# Patient Record
Sex: FEMALE | Race: WHITE | NOT HISPANIC OR LATINO | Employment: FULL TIME | URBAN - METROPOLITAN AREA
[De-identification: names, ages, dates, MRNs, and addresses within clinical notes are randomized per-mention and may not be internally consistent; named-entity substitution may affect disease eponyms.]

---

## 2017-07-18 ENCOUNTER — TRANSFERRED RECORDS (OUTPATIENT)
Dept: HEALTH INFORMATION MANAGEMENT | Facility: CLINIC | Age: 47
End: 2017-07-18

## 2017-12-19 ENCOUNTER — TRANSFERRED RECORDS (OUTPATIENT)
Dept: HEALTH INFORMATION MANAGEMENT | Facility: CLINIC | Age: 47
End: 2017-12-19

## 2018-09-05 ENCOUNTER — TRANSFERRED RECORDS (OUTPATIENT)
Dept: HEALTH INFORMATION MANAGEMENT | Facility: CLINIC | Age: 48
End: 2018-09-05

## 2019-01-15 ENCOUNTER — TRANSFERRED RECORDS (OUTPATIENT)
Dept: HEALTH INFORMATION MANAGEMENT | Facility: CLINIC | Age: 49
End: 2019-01-15

## 2019-03-12 ENCOUNTER — TRANSFERRED RECORDS (OUTPATIENT)
Dept: HEALTH INFORMATION MANAGEMENT | Facility: CLINIC | Age: 49
End: 2019-03-12

## 2020-10-03 ENCOUNTER — TRANSFERRED RECORDS (OUTPATIENT)
Dept: HEALTH INFORMATION MANAGEMENT | Facility: CLINIC | Age: 50
End: 2020-10-03

## 2020-10-11 ENCOUNTER — TRANSFERRED RECORDS (OUTPATIENT)
Dept: HEALTH INFORMATION MANAGEMENT | Facility: CLINIC | Age: 50
End: 2020-10-11

## 2020-10-22 ENCOUNTER — TRANSFERRED RECORDS (OUTPATIENT)
Dept: HEALTH INFORMATION MANAGEMENT | Facility: CLINIC | Age: 50
End: 2020-10-22

## 2020-10-27 ENCOUNTER — TRANSCRIBE ORDERS (OUTPATIENT)
Dept: OTHER | Age: 50
End: 2020-10-27

## 2020-10-27 DIAGNOSIS — C50.919 INVASIVE DUCTAL CARCINOMA OF BREAST (H): ICD-10-CM

## 2020-10-27 DIAGNOSIS — C50.919 INFILTRATING DUCTAL CARCINOMA OF BREAST (H): Primary | ICD-10-CM

## 2020-10-28 ENCOUNTER — PATIENT OUTREACH (OUTPATIENT)
Dept: SURGERY | Facility: CLINIC | Age: 50
End: 2020-10-28

## 2020-10-28 NOTE — TELEPHONE ENCOUNTER
New Patient Oncology Nurse Navigator Note     Referring provider: Dr. Chen Ma     Referring Clinic/Organization: [facility]      Referred to: Surgical Oncology - Breast Surgery    Requested provider (if applicable): First available provider    Referral Received: 10/28/20       Evaluation for : Invasive ductal Carcinoma and DCIS of Right breast      Clinical History (per Nurse review of records provided):      - Right breast mass seen on mammogram, US guided biopsy completed.     US 10/20:   ULTRASOUND: Ultrasound targeted to the mammographic mass reveals a hypoechoic, solid irregular mass at the 11:00 position 6 cm from the nipple measuring 5 x 5 x 5 mm, a suspicious finding for which ultrasound-guided biopsy is recommended. Sonographic survey of the axilla is unremarkable.    Clinical Assessment / Barriers to Care (Per Nurse):    Unable to assess at this time.       Records Location:     [facility] Everywhere     Records Needed:     PATHOLOGY REPORT  [facility]     Breast MRI - per notes was done on 10/28.       Additional testing needed prior to consult:     NONE AT THIS TIME    Referral updates and Plan:       Consult with Surgical Oncology       10/28/2020 4:11 PM - Called and left a message for patient to call back and discuss referral and previous work up and location, informed her in the message that we are working on getting outside records and that we are hoping to get process moving for her.     10/29/2020 12:40 PM - Patient returned call regarding scheduling consult with surgery. Informed her that we are also waiting on the finalized pathology that indicates her hormone status. Informed her that pending those results it maybe recommended she meet with medical oncology first. She verbalized understanding and confirmed plan for appointment with surgery for now. Patient was transferred to scheduling to finalize appointment details  with Dr. Alexander.       Sasha Bowie, RN, BSN   Surgical Oncology New Patient Nurse Navigator  [facility] [place] [facility]  [phone]

## 2020-10-29 NOTE — TELEPHONE ENCOUNTER
ONCOLOGY INTAKE: Records Information      APPT INFORMATION:  Referring provider:  Dr. Chen Colon MD  Referring provider s clinic:  [facility]  Reason for visit/diagnosis:  Infiltrating ductal carcinoma of breast   Has patient been notified of appointment date and time?: Yes    RECORDS INFORMATION:  Were the records received with the referral (via [employer])? Yes    Has patient been seen for any external appt for this diagnosis? Yes    If yes, where? [facility]    Has patient had any imaging or procedures outside of [facility] for this condition? Yes      If Yes, where? [facility]    ADDITIONAL INFORMATION:  None

## 2020-10-30 NOTE — TELEPHONE ENCOUNTER
Action    Action Taken 10/30/20:    -Imaging from [facility] (10/2020, Reports in [facility]) previously resolved. Additional imaging requested, detailed below.    -[employer] Tracking/[facility] - Path: [id number]    -Imaging from [facility] resolved, and now viewable to PACS  12:14 PM     RECORDS STATUS - BREAST    RECORDS REQUESTED FROM:    DATE REQUESTED:    NOTES DETAILS STATUS   OFFICE NOTE from referring provider [facility] -  Dr. Chen Colon MD   OFFICE NOTE from medical oncologist     OFFICE NOTE from surgeon     OFFICE NOTE from radiation oncologist     DISCHARGE SUMMARY from hospital     DISCHARGE REPORT from the ER     OPERATIVE REPORT     MEDICATION LIST     CLINICAL TRIAL TREATMENTS TO DATE     LABS     PATHOLOGY REPORTS  (Tissue diagnosis, Stage, ER/FL percentage positive and intensity of staining, HER2 IHC, FISH, and all biopsies from breast and any distant metastasis)                 [facility], Report & Slides requested 10/30 10/23/20   GENONOMIC TESTING     TYPE:   (Next Generation Sequencing, including Foundation One testing, and Oncotype score)     IMAGING (NEED IMAGES & REPORT)     CT SCANS     MRI     MAMMO PACS 3/13/19, 12/20/17, 4/2/15: , Report in [facility]   ULTRASOUND     PET     BONE SCAN     BRAIN MRI PACS 7/18/17: , Report in [facility]

## 2020-10-30 NOTE — TELEPHONE ENCOUNTER
Action 10.30.20 MJ   Action Taken Received med recs from . Sent to scanning. Placed in Felicitas's folder as well.

## 2020-11-02 ENCOUNTER — PATIENT OUTREACH (OUTPATIENT)
Dept: SURGERY | Facility: CLINIC | Age: 50
End: 2020-11-02

## 2020-11-02 NOTE — TELEPHONE ENCOUNTER
Surgical Oncology RN Care Coordination Note:     Returned call to patient and left her a message letting her know that we have access to the pathology report and that she will have a lab appointment tentatively scheduled after her visit with Dr. Alexander so that if he decides he wants labs done post visit she can just have them drawn that day. Informed her if she has more questions she can contact me back directly to discuss.     Sasha Bowie, RN, BSN  Care Coordinator

## 2020-11-08 ENCOUNTER — HEALTH MAINTENANCE LETTER (OUTPATIENT)
Age: 50
End: 2020-11-08

## 2020-11-09 ENCOUNTER — ONCOLOGY VISIT (OUTPATIENT)
Dept: ONCOLOGY | Facility: CLINIC | Age: 50
End: 2020-11-09
Attending: RADIOLOGY
Payer: COMMERCIAL

## 2020-11-09 ENCOUNTER — PRE VISIT (OUTPATIENT)
Dept: ONCOLOGY | Facility: CLINIC | Age: 50
End: 2020-11-09

## 2020-11-09 VITALS
HEART RATE: 77 BPM | RESPIRATION RATE: 18 BRPM | HEIGHT: 68 IN | DIASTOLIC BLOOD PRESSURE: 76 MMHG | SYSTOLIC BLOOD PRESSURE: 119 MMHG | OXYGEN SATURATION: 98 % | TEMPERATURE: 98.5 F | WEIGHT: 136 LBS | BODY MASS INDEX: 20.61 KG/M2

## 2020-11-09 DIAGNOSIS — C50.919 INFILTRATING DUCTAL CARCINOMA OF BREAST (H): ICD-10-CM

## 2020-11-09 PROCEDURE — G0463 HOSPITAL OUTPT CLINIC VISIT: HCPCS

## 2020-11-09 PROCEDURE — 99203 OFFICE O/P NEW LOW 30 MIN: CPT | Performed by: SURGERY

## 2020-11-09 RX ORDER — MULTIPLE VITAMINS W/ MINERALS TAB 9MG-400MCG
1 TAB ORAL DAILY
COMMUNITY
End: 2024-06-28

## 2020-11-09 ASSESSMENT — MIFFLIN-ST. JEOR: SCORE: 1275.39

## 2020-11-09 ASSESSMENT — PAIN SCALES - GENERAL: PAINLEVEL: NO PAIN (0)

## 2020-11-09 NOTE — LETTER
Date:November 17, 2020      Patient was self referred, no letter generated. Do not send.        [facility] Health Information

## 2020-11-09 NOTE — NURSING NOTE
"Oncology Rooming Note    November 9, 2020 4:50 PM   Radha Alcala is a 50 year old female who presents for:    Chief Complaint   Patient presents with     New Patient     Infiltrating ductal carcinoma of breast (H)     Initial Vitals: /76   Pulse 77   Temp 98.5  F (36.9  C) (Oral)   Resp 18   Ht 1.727 m (5' 8\")   Wt 61.7 kg (136 lb)   SpO2 98%   BMI 20.68 kg/m   Estimated body mass index is 20.68 kg/m  as calculated from the following:    Height as of this encounter: 1.727 m (5' 8\").    Weight as of this encounter: 61.7 kg (136 lb). Body surface area is 1.72 meters squared.  No Pain (0) Comment: Data Unavailable   No LMP recorded.  Allergies reviewed: Yes  Medications reviewed: Yes    Medications: Medication refills not needed today.  Pharmacy name entered into [facility]: [id number] - [place] [id number] AT [facility] [address]    Clinical concerns: New patient.       Melissa Cunningham MA            "

## 2020-11-09 NOTE — LETTER
11/9/2020         RE: Radha Alcala  [address] [facility] [address]        Dear Colleague,    Thank you for referring your patient, Radha Alcala, to the [facility]. Please see a copy of my visit note below.    HISTORY OF PRESENT ILLNESS:  Radha Alcala is a 50-year-old woman who presents for a second opinion regarding treatment of her right breast cancer.  On 10/11 she underwent a screening mammogram which revealed a spiculated mass at the 11 o'clock position.  She had an ultrasound which confirmed a 5 mm, suspicious mass.  She underwent a core needle biopsy which demonstrated a grade 2 invasive ductal cancer.  The tumor was estrogen receptor positive, progesterone receptor positive, HER-2 positive.  There was also DCIS present.  There was suspicion for angiolymphatic invasion.  She had a breast MRI which demonstrated a 5 mm mass.  There were no other suspicious breast findings and her lymph nodes were negative.  She is now here to talk about treatment options.  She has not been able to palpate a mass in her breast.  She denies any symptoms of metastatic disease.  She has had no prior history of any breast problems.      PAST MEDICAL HISTORY:  No major medical problems.      FAMILY HISTORY:  Negative for breast or ovarian cancer.      PHYSICAL EXAMINATION:   GENERAL:  She is a well-appearing woman in no apparent distress.   HEENT:  Head and neck examination reveals no cervical, supraclavicular or infraclavicular lymphadenopathy.   BREASTS:  Bilateral breast examination reveals no dominant masses, skin changes, nipple changes or axillary lymphadenopathy.      IMPRESSION:  5 mm HER-2 positive, ER positive breast cancer.      PLAN:  I talked to her about the various treatment options, including a mastectomy with or without reconstruction versus lumpectomy plus radiation therapy.  She is interested in breast-conserving surgery.  I did recommend a sentinel lymph  node biopsy.  I told her that endocrine therapy would be recommended.  I told her that usually for HER-2 positive breast cancers, we do recommend chemotherapy and Herceptin.  This is often given in a neoadjuvant setting.  However, I told her there is no survival benefit of having one approach over the other and it would not alter her surgical treatment.  I did tell her that by having preoperative chemotherapy there is an advantage of having the prognostic information of a complete pathologic response.  She and her  want to proceed with a surgery-first approach.  We will obtain genetic testing on her and we will plan on doing a lumpectomy, sentinel lymph node biopsy and refer her to Oncology.      TT:  40 minutes.  CT:  30 minutes.           Again, thank you for allowing me to participate in the care of your patient.        Sincerely,        Kirby Alexander MD

## 2020-11-09 NOTE — PROGRESS NOTES
HISTORY OF PRESENT ILLNESS:  Radha Alcala is a 50-year-old woman who presents for a second opinion regarding treatment of her right breast cancer.  On 10/11 she underwent a screening mammogram which revealed a spiculated mass at the 11 o'clock position.  She had an ultrasound which confirmed a 5 mm, suspicious mass.  She underwent a core needle biopsy which demonstrated a grade 2 invasive ductal cancer.  The tumor was estrogen receptor positive, progesterone receptor positive, HER-2 positive.  There was also DCIS present.  There was suspicion for angiolymphatic invasion.  She had a breast MRI which demonstrated a 5 mm mass.  There were no other suspicious breast findings and her lymph nodes were negative.  She is now here to talk about treatment options.  She has not been able to palpate a mass in her breast.  She denies any symptoms of metastatic disease.  She has had no prior history of any breast problems.      PAST MEDICAL HISTORY:  No major medical problems.      FAMILY HISTORY:  Negative for breast or ovarian cancer.      PHYSICAL EXAMINATION:   GENERAL:  She is a well-appearing woman in no apparent distress.   HEENT:  Head and neck examination reveals no cervical, supraclavicular or infraclavicular lymphadenopathy.   BREASTS:  Bilateral breast examination reveals no dominant masses, skin changes, nipple changes or axillary lymphadenopathy.      IMPRESSION:  5 mm HER-2 positive, ER positive breast cancer.      PLAN:  I talked to her about the various treatment options, including a mastectomy with or without reconstruction versus lumpectomy plus radiation therapy.  She is interested in breast-conserving surgery.  I did recommend a sentinel lymph node biopsy.  I told her that endocrine therapy would be recommended.  I told her that usually for HER-2 positive breast cancers, we do recommend chemotherapy and Herceptin.  This is often given in a neoadjuvant setting.  However, I told her there is no survival  benefit of having one approach over the other and it would not alter her surgical treatment.  I did tell her that by having preoperative chemotherapy there is an advantage of having the prognostic information of a complete pathologic response.  She and her  want to proceed with a surgery-first approach.  We will obtain genetic testing on her and we will plan on doing a lumpectomy, sentinel lymph node biopsy and refer her to Oncology.      TT:  40 minutes.  CT:  30 minutes.

## 2020-11-10 ENCOUNTER — TELEPHONE (OUTPATIENT)
Dept: ONCOLOGY | Facility: CLINIC | Age: 50
End: 2020-11-10

## 2020-11-10 NOTE — TELEPHONE ENCOUNTER
Spoke with the patient and she asked to have her lab draw tomorrow 11/11 at 2:45 PM instead of 11/12. Moved appointment and updated nurse.

## 2020-11-11 ENCOUNTER — PRE VISIT (OUTPATIENT)
Dept: ONCOLOGY | Facility: CLINIC | Age: 50
End: 2020-11-11

## 2020-11-11 DIAGNOSIS — C50.919 INFILTRATING DUCTAL CARCINOMA OF BREAST (H): ICD-10-CM

## 2020-11-11 NOTE — TELEPHONE ENCOUNTER
ONCOLOGY INTAKE: Records Information      APPT INFORMATION:  Referring provider:  Dr. Alexander  Referring provider s clinic:  [facility]  Reason for visit/diagnosis:  breast cancer  Has patient been notified of appointment date and time?: yes    RECORDS INFORMATION:  Were the records received with the referral (via [employer])? no    Has patient been seen for any external appt for this diagnosis? no    If yes, where? n/a    Has patient had any imaging or procedures outside of [facility] for this condition? no      If Yes, where? n/a    ADDITIONAL INFORMATION:  none

## 2020-11-12 ENCOUNTER — DOCUMENTATION ONLY (OUTPATIENT)
Dept: ONCOLOGY | Facility: CLINIC | Age: 50
End: 2020-11-12

## 2020-11-12 ENCOUNTER — HOSPITAL ENCOUNTER (OUTPATIENT)
Facility: AMBULATORY SURGERY CENTER | Age: 50
End: 2020-11-12
Attending: SURGERY
Payer: COMMERCIAL

## 2020-11-12 DIAGNOSIS — Z11.59 ENCOUNTER FOR SCREENING FOR OTHER VIRAL DISEASES: Primary | ICD-10-CM

## 2020-11-12 DIAGNOSIS — C50.919 INFILTRATING DUCTAL CARCINOMA OF BREAST (H): ICD-10-CM

## 2020-11-12 LAB — COPATH REPORT: NORMAL

## 2020-11-12 NOTE — TELEPHONE ENCOUNTER
RECORDS STATUS - BREAST    RECORDS REQUESTED FROM: PLEASE SEE PRE VISIT DR. SARAVIA 11/9/20   DATE REQUESTED:    NOTES DETAILS STATUS   OFFICE NOTE from referring provider Kirby Soler MD in  [facility]   OFFICE NOTE from medical oncologist     OFFICE NOTE from surgeon     OFFICE NOTE from radiation oncologist     DISCHARGE SUMMARY from hospital     DISCHARGE REPORT from the ER     OPERATIVE REPORT     MEDICATION LIST     CLINICAL TRIAL TREATMENTS TO DATE     LABS     PATHOLOGY REPORTS  (Tissue diagnosis, Stage, ER/TX percentage positive and intensity of staining, HER2 IHC, FISH, and all biopsies from breast and any distant metastasis)                     GENONOMIC TESTING     TYPE:   (Next Generation Sequencing, including Foundation One testing, and Oncotype score)     IMAGING (NEED IMAGES & REPORT)     CT SCANS     MRI     MAMMO     ULTRASOUND     PET     BONE SCAN     BRAIN MRI

## 2020-11-12 NOTE — PROGRESS NOTES
I contacted the patient via [employer] and spoke with her to confirm the scheduled dates and provide the following information:     Surgery is scheduled with Dr. Alexander on 12/23 at [facility].  Scheduled per surgeon.      COVID-19 test scheduled for 12/21    H&P: to be completed by PCP.  POST-OP: 1/6, 1/22.    They are aware that they will receive a call  ~2 days prior to the scheduled procedure and will be given an exact arrival/start time.    The surgery packet was provided via [employer].

## 2020-11-13 ENCOUNTER — TRANSCRIBE ORDERS (OUTPATIENT)
Dept: OTHER | Age: 50
End: 2020-11-13

## 2020-11-13 DIAGNOSIS — C50.919 BREAST CANCER (H): Primary | ICD-10-CM

## 2020-11-13 NOTE — TELEPHONE ENCOUNTER
RECORDS STATUS - BREAST    RECORDS REQUESTED FROM: EPIC   DATE REQUESTED: 11/18/2020    NOTES DETAILS STATUS   OFFICE NOTE from referring provider     OFFICE NOTE from medical oncologist Complete Epic    Oncology Visit with Dr. Alexander 11/9/2020 11/2/2002 [facility] Infiltrating ductal carcinoma of upper-outer quadrant of right breast      OFFICE NOTE from surgeon Complete See Biopsy Report Below    OFFICE NOTE from radiation oncologist     DISCHARGE SUMMARY from hospital N/A    DISCHARGE REPORT from the ER     OPERATIVE REPORT Complete  See Biopsy Report Below    MEDICATION LIST Complete Epic/   CLINICAL TRIAL TREATMENTS TO DATE     LABS     PATHOLOGY REPORTS  (Tissue diagnosis, Stage, ER/MI percentage positive and intensity of staining, HER2 IHC, FISH, and all biopsies from breast and any distant metastasis)                 Biopsy was done at  [facility]     10/22/2020   Surgical Pathology                                Case: [id number]   Breast, right, 11:00 6 cm fn , needle core biopsy:  Infiltrating ductal carcinoma, Omnica grade 2  Associated ductal carcinoma in-situ, nuclear grade 2  Foci suspicious for angiolymphatic invasion   GENONOMIC TESTING     TYPE:   (Next Generation Sequencing, including Foundation One testing, and Oncotype score) Complete [facility]- Her 2 Fish 10/22/2020    IMAGING (NEED IMAGES & REPORT)     CT SCANS     MRI Complete - [facility] MR Breast 10/28/2020    NM Lymph Upcoming internal IMG 12/23/2020   MAMMO Upcoming internal IMG    Request- [facility]  MA Breast Specimen Right 12/23/2020     Request- [facility]  10/22/2020    ULTRASOUND Upcoming internal IMG US Breast Wire Placement 12/23/2020    PET     BONE SCAN     BRAIN MRI

## 2020-11-16 NOTE — PROGRESS NOTES
Oncology Consultation:  Date on this visit: 11/17/2020    Radha Alcala is referred by Dr.Todd MELISSA Alexander for an oncology consultation. She requires evaluation for new diagnosis of right breast cancer.    Primary Physician: No Ref-Primary, Physician     History Of Present Illness:  Ms. Alcala is a 50 year old female with ER positive, HER2 positive right breast cancer.  Routine screening mammogram on 10/12/2020 showed a spiculated mass at 11:00, posterior depth of the right breast.  Ultrasound confirmed a mass measuring 5 mm at 11:00, 6 cm from the nipple and normal appearing lymph nodes in the axilla.  Right breast biopsy on 10/22/2020 was consistent with a grade 2 invasive ductal carcinoma with associated DCIS.  There was a focus suspicious for angiolymphatic invasion.  ER was moderate in 70% and [place] weak in 5%,  HER-2 was equivocal by IHC and amplified by FISH with approximately 16.4 HER2 signals per cell and a HER2/CEP17 ratio of 6.8.  Right breast lumpectomy with Dr. Alexander is tentatively scheduled for 12/23/2020.      Ms. Alcala denies prior h/o breast issues or biopsies  She is generally in good health.  She has some anxiety/depression, but has never required treatment.  She exercises regularly, walking, biking, and doing yoga.  She has right knee pain due to a sprained AC ligament and also has a pulled right hamstring.  She denies bone or joint aches or pains.  She has had no cough or shortness of breath.  She has no abdominal complaints.  She has an occasional headache due to dehydration.  She reports increase in headaches the last two weeks due to stress.  She denies focal neurologic complaints.  The remainder of a complete 12 point review of systems was reviewed with the patient and was negative with the exception of that mentioned above.    Patient underwent menarche at 13-13 yo.  She has two children.  Her pregnancies were at ages 30 and 33 years old.  She  each of her children for  approximately 1 year.  She had an IUD removed recently and has not had a menstrual bleed since.  She denies menopause symptoms with the exception of perhaps an occasional hot flash at night.    Past Medical/Surgical History:  1.  Breast cancer as per HPI  2.  Rosacea  3.  Recurrent sinusitis    Allergies:  Allergies as of 11/17/2020     (No Known Allergies)     Current Medications:  Current Outpatient Medications   Medication Sig Dispense Refill     levonorgestrel (MIRENA) 20 MCG/24HR IUD 1 each by Intrauterine route once       multivitamin w/minerals (MULTI-VITAMIN) tablet Take 1 tablet by mouth daily        Family History:  Mother with a h/o colorectal cancer at 80 yo.  Father with a h/o prostate cancer.  Paternal grandfather with a h/o brain cancer.  Patient has 3 brothers and a sister without h/o cancer.      Social History:  .  Has two children, both out of the house.  Patient works as a  at the [employer].  She is a lifetime nonsmoker.  She has approximately 1-2 drinks/week.  She denies illicit drug use.      Physical Exam:  General:  Well appearing adult female in NAD.  Eyes:  No erythema or discharge.  Respiratory:  Breathing comfortably on room air.  No wheezing or distress.  Musculoskeletal:  Full ROM of the bilateral upper extremities.  Skin:  No visible concerning skin rashes or lesions  Neuro:  No notable tremor and dyskinetic movements.  Psych:  Mood and affect appear normal.    The rest of a comprehensive physical examination is deferred due to PHE (public health emergency) video visit restrictions.    Laboratory/Imaging Studies  10/12/2020 Bilateral screening mammograms ([facility]):  Spiculated mass at 11:00, posterior depth.    10/22/2020 Diagnostic mammogram and right breast ultrasound:  - The right breast is heterogeneously dense.  Persistent mass 11:00, middle depth.  - Ultrasound shows mass at 11:00, 6 cm from the nipple to measure 5 mm.  Axilla is  unremarkable.    10/22/2020 Right breast biopsy:  - Grade 2 infiltrating ductal carcinoma  - Intermediate grade DCIS  - Foci suspicious for angiolymphatic invasion  - ER moderate in 70%  - PA weak in 5%  - HER2 equivocal by IHC  - HER2 positive by FISH with approximately 16.4 HER2 signals per cell and a HER2/CEP17 ratio of 6.8.    ASSESSMENT/PLAN:  Ms. Alcala is a 49 yo female with a stage Ia, [id number], grade 2, ER positive, HER2 positive breast cancer.  Today I discussed the diagnosis, staging, and treatment options in details with the patient.  We reviewed her imaging which shows a 5 mm mass in the right breast and no axillary lymphadenopathy.  We reviewed the receptor status of her breast cancer which is ER positive, PA positive, and HER-2 positive.  Taken altogether, this is a stage Ia breast cancer.  The goal of treatment is curative, or in other words, to reduce the future risk of recurrence as much as possible.    We then discussed treatment options including surgery, chemotherapy, radiation, and endocrine therapy.  We reviewed breast cancer can be excised via either lumpectomy or mastectomy.  She has already met in surgical consultation with Dr. Alexander and a right breast lumpectomy is planned.  She will have a sentinel lymph node biopsy at the time of surgery.  Given lumpectomy excision, an adjuvant course of radiation will be recommended.      We then reviewed the role of chemotherapy in eliminating micrometastatic disease and preventing distant recurrence.  We discussed HER2 positive breast cancer are prone to increased rates of proliferation and recurrence.  A course of combined chemotherapy and HER2 targeted therapy can reduce this risk.  According to [facility] guidelines, any HER2 positive breast cancer >5 mm should be treated with a course of combined chemo- and HER2 targeted therapy.  For a tumor 5 mm or less the risk vs benefits need to be weighed.  I am therefore in agreement  with proceeding with surgical excision prior to deciding whether or not there is a benefit to chemo + HER2 therapy.    We discussed if final pathology shows tumor size >5 mm or there is lymph node involvement chemotherapy plus HER2 targeted therapy will be recommended.  If tumor is >5 mm, but there is no lymph node involvement, my recommendation will be for treatment with Taxol and Herceptin administered IV weekly x 12 weeks.  If lymph node involvement with modify this recommendation.  We reviewed the potential side effects of chemo- and HER2 targeted therapy including alopecia, fatigue, nausea, diarrhea, low blood counts, increased risk of infection, neuropathy, hypersensitivity reaction and liver and cardiac toxicity.  Patient inquired about cold capping.  We discussed if plan is to proceed with chemotherapy, we will provide her information on cold capping.  If chemotherapy is recommended, it would be administered after surgery, but prior to radiation.  Following completion of the initial 12 weeks of TH, she would continue on HER2 targeted therapy only to complete one year total of HER2 targeted therapy.  This would be administered IV once every 3 weeks.    Following radiation, we will plan to initiate endocrine therapy.  Choice of medication for endocrine therapy will depend on her menopausal status, which is not clear at this time.  I recommend checking serum estradiol and FSH levels to further define menopausal status.  If she is pre- or perimenopausal, initial treatment will be with Tamoxifen.  If she is postmenopausal, treatment will be with letrozole.  We briefly reviewed potential side effects of endocrine therapy including hot flashes, vaginal dryness, myalgias, arthralgias, and mood disorder.  We discussed letrozole also carries risk for thinning of the bones.  Plan will be to treat with endocrine therapy for a minimum of 5 years.    Ms. Alcala had multiple questions which I answered to the best of my  ability today.  Plan at this time is to draw serum hormone levels to define menopausal status.  She will proceed with planned right breast lumpectomy and SLN biopsy.  I will meet with her in clinic 1-2 weeks after surgery to review the pathology and treatment plan with her.    Esperanza Nino MD

## 2020-11-17 ENCOUNTER — VIRTUAL VISIT (OUTPATIENT)
Dept: ONCOLOGY | Facility: CLINIC | Age: 50
End: 2020-11-17
Attending: SURGERY
Payer: COMMERCIAL

## 2020-11-17 DIAGNOSIS — C50.411 MALIGNANT NEOPLASM OF UPPER-OUTER QUADRANT OF RIGHT BREAST IN FEMALE, ESTROGEN RECEPTOR POSITIVE (H): ICD-10-CM

## 2020-11-17 DIAGNOSIS — Z17.0 MALIGNANT NEOPLASM OF UPPER-OUTER QUADRANT OF RIGHT BREAST IN FEMALE, ESTROGEN RECEPTOR POSITIVE (H): ICD-10-CM

## 2020-11-17 DIAGNOSIS — N91.2 AMENORRHEA: Primary | ICD-10-CM

## 2020-11-17 DIAGNOSIS — C50.919 INFILTRATING DUCTAL CARCINOMA OF BREAST (H): ICD-10-CM

## 2020-11-17 PROCEDURE — 99205 OFFICE O/P NEW HI 60 MIN: CPT | Mod: 95 | Performed by: INTERNAL MEDICINE

## 2020-11-17 NOTE — PROGRESS NOTES
"Gayla Alcala is a 50 year old female who is being evaluated via a billable video visit.      The patient has been notified of following:     \"This video visit will be conducted via a call between you and your physician/provider. We have found that certain health care needs can be provided without the need for an in-person physical exam.  This service lets us provide the care you need with a video conversation.  If a prescription is necessary we can send it directly to your pharmacy.  If lab work is needed we can place an order for that and you can then stop by our lab to have the test done at a later time.    Video visits are billed at different rates depending on your insurance coverage.  Please reach out to your insurance provider with any questions.    If during the course of the call the physician/provider feels a video visit is not appropriate, you will not be charged for this service.\"    Patient has given verbal consent for Video visit? Yes  How would you like to obtain your AVS? MyChart  If you are dropped from the video visit, the video invite should be resent to: Send to e-mail at: [email or web address]  Will anyone else be joining your video visit? No    Vitals - Patient Reported  Weight (Patient Reported): 61.7 kg (136 lb)  Pain Score: No Pain (0)        I have reviewed and updated patient's allergy and medication list.    Concerns: Should they start any treatment now before surgery?    How long before or after the surgery should she have dental work?    How would the genetic test effect the decisions for treatment?    Patient has daughter coming home from college - questions regarding Covid19 risk  Refills: None      Desiree Cortes CMA    Video-Visit Details    Type of service:  Video Visit    Total time of video visit:   60 minutes    Originating Location (pt. Location): Home    Distant Location (provider location):  [facility]     Platform used for Video Visit: " Rohan Nino MD

## 2020-11-17 NOTE — LETTER
11/17/2020         RE: Radha Alcala  [address] [facility] [address]        Dear Colleague,    Thank you for referring your patient, Radha Alcala, to the [facility]. Please see a copy of my visit note below.    Oncology Consultation:  Date on this visit: 11/17/2020    Radha Alcala is referred by Dr.Todd MELISSA Alexander for an oncology consultation. She requires evaluation for new diagnosis of right breast cancer.    Primary Physician: No Ref-Primary, Physician     History Of Present Illness:  Ms. Alcala is a 50 year old female with ER positive, HER2 positive right breast cancer.  Routine screening mammogram on 10/12/2020 showed a spiculated mass at 11:00, posterior depth of the right breast.  Ultrasound confirmed a mass measuring 5 mm at 11:00, 6 cm from the nipple and normal appearing lymph nodes in the axilla.  Right breast biopsy on 10/22/2020 was consistent with a grade 2 invasive ductal carcinoma with associated DCIS.  There was a focus suspicious for angiolymphatic invasion.  ER was moderate in 70% and KS weak in 5%,  HER-2 was equivocal by IHC and amplified by FISH with approximately 16.4 HER2 signals per cell and a HER2/CEP17 ratio of 6.8.  Right breast lumpectomy with Dr. Alexander is tentatively scheduled for 12/23/2020.      Ms. Alcala denies prior h/o breast issues or biopsies  She is generally in good health.  She has some anxiety/depression, but has never required treatment.  She exercises regularly, walking, biking, and doing yoga.  She has right knee pain due to a sprained AC ligament and also has a pulled right hamstring.  She denies bone or joint aches or pains.  She has had no cough or shortness of breath.  She has no abdominal complaints.  She has an occasional headache due to dehydration.  She reports increase in headaches the last two weeks due to stress.  She denies focal neurologic complaints.  The remainder of a complete 12 point review of  systems was reviewed with the patient and was negative with the exception of that mentioned above.    Patient underwent menarche at 13-13 yo.  She has two children.  Her pregnancies were at ages 30 and 33 years old.  She  each of her children for approximately 1 year.  She had an IUD removed recently and has not had a menstrual bleed since.  She denies menopause symptoms with the exception of perhaps an occasional hot flash at night.    Past Medical/Surgical History:  1.  Breast cancer as per HPI  2.  Rosacea  3.  Recurrent sinusitis    Allergies:  Allergies as of 11/17/2020     (No Known Allergies)     Current Medications:  Current Outpatient Medications   Medication Sig Dispense Refill     levonorgestrel (MIRENA) 20 MCG/24HR IUD 1 each by Intrauterine route once       multivitamin w/minerals (MULTI-VITAMIN) tablet Take 1 tablet by mouth daily        Family History:  Mother with a h/o colorectal cancer at 80 yo.  Father with a h/o prostate cancer.  Paternal grandfather with a h/o brain cancer.  Patient has 3 brothers and a sister without h/o cancer.      Social History:  .  Has two children, both out of the house.  Patient works as a  at the [employer].  She is a lifetime nonsmoker.  She has approximately 1-2 drinks/week.  She denies illicit drug use.      Physical Exam:  General:  Well appearing adult female in NAD.  Eyes:  No erythema or discharge.  Respiratory:  Breathing comfortably on room air.  No wheezing or distress.  Musculoskeletal:  Full ROM of the bilateral upper extremities.  Skin:  No visible concerning skin rashes or lesions  Neuro:  No notable tremor and dyskinetic movements.  Psych:  Mood and affect appear normal.    The rest of a comprehensive physical examination is deferred due to PHE (public health emergency) video visit restrictions.    Laboratory/Imaging Studies  10/12/2020 Bilateral screening mammograms ([facility]):  Spiculated mass at  11:00, posterior depth.    10/22/2020 Diagnostic mammogram and right breast ultrasound:  - The right breast is heterogeneously dense.  Persistent mass 11:00, middle depth.  - Ultrasound shows mass at 11:00, 6 cm from the nipple to measure 5 mm.  Axilla is unremarkable.    10/22/2020 Right breast biopsy:  - Grade 2 infiltrating ductal carcinoma  - Intermediate grade DCIS  - Foci suspicious for angiolymphatic invasion  - ER moderate in 70%  - AZ weak in 5%  - HER2 equivocal by IHC  - HER2 positive by FISH with approximately 16.4 HER2 signals per cell and a HER2/CEP17 ratio of 6.8.    ASSESSMENT/PLAN:  Ms. Alcala is a 51 yo female with a stage Ia, [id number], grade 2, ER positive, HER2 positive breast cancer.  Today I discussed the diagnosis, staging, and treatment options in details with the patient.  We reviewed her imaging which shows a 5 mm mass in the right breast and no axillary lymphadenopathy.  We reviewed the receptor status of her breast cancer which is ER positive, AZ positive, and HER-2 positive.  Taken altogether, this is a stage Ia breast cancer.  The goal of treatment is curative, or in other words, to reduce the future risk of recurrence as much as possible.    We then discussed treatment options including surgery, chemotherapy, radiation, and endocrine therapy.  We reviewed breast cancer can be excised via either lumpectomy or mastectomy.  She has already met in surgical consultation with Dr. Alexander and a right breast lumpectomy is planned.  She will have a sentinel lymph node biopsy at the time of surgery.  Given lumpectomy excision, an adjuvant course of radiation will be recommended.      We then reviewed the role of chemotherapy in eliminating micrometastatic disease and preventing distant recurrence.  We discussed HER2 positive breast cancer are prone to increased rates of proliferation and recurrence.  A course of combined chemotherapy and HER2 targeted therapy can reduce this risk.  According to  [facility] guidelines, any HER2 positive breast cancer >5 mm should be treated with a course of combined chemo- and HER2 targeted therapy.  For a tumor 5 mm or less the risk vs benefits need to be weighed.  I am therefore in agreement with proceeding with surgical excision prior to deciding whether or not there is a benefit to chemo + HER2 therapy.    We discussed if final pathology shows tumor size >5 mm or there is lymph node involvement chemotherapy plus HER2 targeted therapy will be recommended.  If tumor is >5 mm, but there is no lymph node involvement, my recommendation will be for treatment with Taxol and Herceptin administered IV weekly x 12 weeks.  If lymph node involvement with modify this recommendation.  We reviewed the potential side effects of chemo- and HER2 targeted therapy including alopecia, fatigue, nausea, diarrhea, low blood counts, increased risk of infection, neuropathy, hypersensitivity reaction and liver and cardiac toxicity.  Patient inquired about cold capping.  We discussed if plan is to proceed with chemotherapy, we will provide her information on cold capping.  If chemotherapy is recommended, it would be administered after surgery, but prior to radiation.  Following completion of the initial 12 weeks of TH, she would continue on HER2 targeted therapy only to complete one year total of HER2 targeted therapy.  This would be administered IV once every 3 weeks.    Following radiation, we will plan to initiate endocrine therapy.  Choice of medication for endocrine therapy will depend on her menopausal status, which is not clear at this time.  I recommend checking serum estradiol and FSH levels to further define menopausal status.  If she is pre- or perimenopausal, initial treatment will be with Tamoxifen.  If she is postmenopausal, treatment will be with letrozole.  We briefly reviewed potential side effects of endocrine therapy including hot flashes, vaginal dryness,  "myalgias, arthralgias, and mood disorder.  We discussed letrozole also carries risk for thinning of the bones.  Plan will be to treat with endocrine therapy for a minimum of 5 years.    Ms. Alcala had multiple questions which I answered to the best of my ability today.  Plan at this time is to draw serum hormone levels to define menopausal status.  She will proceed with planned right breast lumpectomy and SLN biopsy.  I will meet with her in clinic 1-2 weeks after surgery to review the pathology and treatment plan with her.    MD Aicha Harrisonestevan Alcala is a 50 year old female who is being evaluated via a billable video visit.      The patient has been notified of following:     \"This video visit will be conducted via a call between you and your physician/provider. We have found that certain health care needs can be provided without the need for an in-person physical exam.  This service lets us provide the care you need with a video conversation.  If a prescription is necessary we can send it directly to your pharmacy.  If lab work is needed we can place an order for that and you can then stop by our lab to have the test done at a later time.    Video visits are billed at different rates depending on your insurance coverage.  Please reach out to your insurance provider with any questions.    If during the course of the call the physician/provider feels a video visit is not appropriate, you will not be charged for this service.\"    Patient has given verbal consent for Video visit? Yes  How would you like to obtain your AVS? MyChart  If you are dropped from the video visit, the video invite should be resent to: Send to e-mail at: [email or web address]  Will anyone else be joining your video visit? No    Vitals - Patient Reported  Weight (Patient Reported): 61.7 kg (136 lb)  Pain Score: No Pain (0)        I have reviewed and updated patient's allergy and medication list.    Concerns: Should they " start any treatment now before surgery?    How long before or after the surgery should she have dental work?    How would the genetic test effect the decisions for treatment?    Patient has daughter coming home from college - questions regarding Covid19 risk  Refills: None      Desiree Cortes CMA    Video-Visit Details    Type of service:  Video Visit    Total time of video visit:   60 minutes    Originating Location (pt. Location): Home    Distant Location (provider location):  [facility] CANCER [facility]     Platform used for Video Visit: [facility]    Esperanza Nino MD            Again, thank you for allowing me to participate in the care of your patient.        Sincerely,        Esperanza Nino MD

## 2020-11-18 ENCOUNTER — PRE VISIT (OUTPATIENT)
Dept: ONCOLOGY | Facility: CLINIC | Age: 50
End: 2020-11-18

## 2020-11-18 ENCOUNTER — VIRTUAL VISIT (OUTPATIENT)
Dept: ONCOLOGY | Facility: CLINIC | Age: 50
End: 2020-11-18
Attending: GENETIC COUNSELOR, MS
Payer: COMMERCIAL

## 2020-11-18 DIAGNOSIS — C50.911 INFILTRATING DUCTAL CARCINOMA OF RIGHT BREAST (H): Primary | ICD-10-CM

## 2020-11-18 DIAGNOSIS — Z80.3 FAMILY HISTORY OF MALIGNANT NEOPLASM OF BREAST: ICD-10-CM

## 2020-11-18 DIAGNOSIS — Z11.59 ENCOUNTER FOR SCREENING FOR OTHER VIRAL DISEASES: Primary | ICD-10-CM

## 2020-11-18 PROCEDURE — 96040 HC GENETIC COUNSELING, EACH 30 MINUTES: CPT | Mod: GT | Performed by: GENETIC COUNSELOR, MS

## 2020-11-18 NOTE — LETTER
Cancer Risk Management  Program Locations    [facility]  [facility]  [facility]  [facility]  Mailing Address  Cancer Risk Management Program  [facility]  [address]  [facility] [address]  [place] [address]    New patient appointments  [phone]  November 18, 2020    Radha Alcala  [address] [facility] [address]      Dear Radha,    It was a pleasure speaking with you over video for genetic counseling on 11/18/2020. Here is a copy of the progress note from our discussion. If you have any additional questions, please feel free to call.    Referring Provider: Kirby Alexander MD    Presenting Information:   I spoke with Radha Alcala over video today for genetic counseling to discuss her personal and family history of cancer. With her permission, this appointment was conducted over video due to COVID-19 precautions. We talked today to review this history, cancer screening recommendations, and available genetic testing options.    Personal History:  Radha is a 50 year old female. She was recently diagnosed with breast cancer at age 50 (right breast, IDC, ER positive, IN positive, HER2 positive). She is currently planning for lumpectomy. She states that her genetic testing results would potentially influence her final surgical decision. She has already had some genetic testing ordered by Dr. Alexander. The Breast Actionable Panel offered by the [facility] at [facility] is currently pending.     She also reports a history of skin cancer (non-melanoma) on her face.    She had her first menstrual period at age 11 or 12, her first child at age 30, and menopausal status is currently unknown (she just had IUD removed). Radha has her ovaries, fallopian tubes and uterus in place. She reports that she has not used hormone  replacement therapy. She has used oral contraceptives and an IUD. Her most recent colonoscopy on 1/15/19 detected no polyps and follow-up was recommended in 5 years. Radha reported no history of tobacco use and alcohol use of 1-2 drinks per week.    Family History: (Please see scanned pedigree for detailed family history information)  Siblings:  She reports no known history of cancer in any of her siblings. She believes that one brother and one sister have had colon polyps.  Maternal:  Her mother was diagnosed with colon cancer at age 81 and passed away in her 80s.  One maternal cousin is in her mid 60s and was diagnosed with breast cancer at age 46.  Another maternal cousin was diagnosed with breast cancer at an unknown age and passed away at age 76.  Paternal:  Her father was diagnosed with prostate cancer in his early 70s. She does not think this was an aggressive prostate cancer. He passed away at age 80.   Her paternal grandfather was diagnosed with brain cancer at age 70 and passed away in his 70s.    Her maternal ethnicity is [place], [place]. Her paternal ethnicity is [place], [place]. There is no known Ashkenazi Orthodoxy ancestry on either side of her family.     Discussion:    Radha's personal and family history of cancer is suggestive of a hereditary cancer syndrome.    We reviewed the features of sporadic, familial, and hereditary cancers. In looking at Radha's family history, it is possible that a cancer susceptibility gene is present due to her recent diagnosis of breast cancer and her maternal family history of two cousins with breast cancer, one diagnosed under age 50.    We discussed the natural history and genetics of breast cancer. We discussed the BRCA1 and BRCA2 genes. Mutations in these genes cause a condition known as Hereditary Breast and Ovarian Cancer syndrome (HBOC). Women with a mutation in either of these genes are at increased risk for breast and ovarian cancer. There is also an  increased risk for a second primary breast cancer. Men with a mutation in either of these genes are at increased risk for breast and prostate cancer. Both women and men may also be at increased risk for pancreatic cancer and melanoma.     We also reviewed other genes that are on the Breast Actionable Panel. A detailed handout regarding these genes will be provided to Radha via You.i and can be found in the after visit summary. Topics included: inheritance pattern, cancer risks, cancer screening recommendations, and also risks, benefits and limitations of testing.    Based on her personal and family history, Radha meets current National Comprehensive Cancer Network (NCCN) criteria for genetic testing of high-penetrance breast and/or ovarian cancer susceptibility genes, which often includes BRCA1, BRCA2, CDH1, PALB2, PTEN, and TP53 among others.     We discussed that there are additional genes that could cause increased risk for breast cancer. As many of these genes present with overlapping features in a family and accurate cancer risk cannot always be established based upon the pedigree analysis alone, it would be reasonable for Radha to consider panel genetic testing to analyze multiple genes at once. The Breast Actionable Panel has already been ordered and results are currently pending.    We briefly reviewed additional genetic testing options including the Breast Cancer Expanded Panel or larger panel options that would include genes associated with other types of cancer. She would like to think more about these reflex options while she waits for the results of the Breast Actionable Panel. We will discuss further testing options again when results of this panel are available.    Medical Management: For Radha, we reviewed that the information from genetic testing may determine:    surgery to treat Radha's active cancer diagnosis (i.e. lumpectomy versus bilateral mastectomy),    additional cancer screening for  which Radha may qualify (i.e. mammogram and breast MRI, more frequent colonoscopies, more frequent dermatologic exams, etc.),    options for risk reducing surgeries Radha could consider (i.e. bilateral mastectomy, surgery to remove her ovaries and/or uterus, etc.),      and targeted chemotherapies for Radha's active cancer, or if she were to develop certain cancers in the future (i.e. immunotherapy for individuals with Mckeon syndrome, PARP inhibitors, etc.).     These recommendations will be discussed in detail once genetic testing is completed.     Plan:  1) No additional genetic testing was ordered today. She is waiting for the results of the Breast Actionable Panel.   2) She will be contacted when these results are available for review. We will further discuss additional testing options at that time.  3) She was encouraged to contact me with any additional questions or concerns.    Taty Magaña MS, [facility]  Licensed, Certified Genetic Counselor  Office: [phone]  Email: [email or web address]                                                                    BREAST ACTIONABLE PANEL    The Breast Actionable cancer panel is a genetic test which analyzes 11 genes known to be associated with an increased lifetime risk of breast and other cancers. Published medical guidelines exist for detection, prevention, risk reduction, and/or treatment for individuals with mutations in these genes.     Of note, the Breast Actionable cancer panel is not considered to be comprehensive. Individuals with a family history of other cancers should be seen by a genetic counselor to discuss the family history and the possibility of expanded genetic testing.     GENES (11) ASSOCIATED CANCER(S) ASSOCIATED CANCER SYNDROME(S)   KODAK breast, pancreas    BRCA1 breast, ovary, pancreas, melanoma, prostate, male breast Hereditary breast /ovarian cancer syndrome (HBOC)   BRCA2 breast, ovary, pancreas, melanoma, prostate, male breast Hereditary  breast /ovarian cancer syndrome (HBOC)   CDH1 breast, stomach, colon Hereditary diffuse gastric cancer   CHEK2 breast, colon, prostate    NBN breast, ovary, prostate    NF1 breast, brain, peripheral nervous system Neurofibromatosis 1   PALB2 breast, pancreas    PTEN breast, thyroid, uterus, colon, kidney Cowden syndrome, Dqkcpfwn-Uhmnw-Idxatcnls syndrome (BRRS), PTEN-related Proteus syndrome (PS), Proteus-like syndrome   STK11 breast, ovary, colon, pancreas, stomach, uterus, cervix Peutz-Jeghers syndrome   TP53 breast, bone, brain, soft tissues, adrenal cortex Li-Fraumeni syndrome     Meeting with a Genetic Counselor  After you receive the results of the Breast Actionable Panel testing, providers will often refer you to see a genetic counselor. This is because patients can have a family history of cancer other than breast cancer and may benefit from a discussion about comprehensive, expanded genetic testing.     Additionally, you and the genetic counselor will discuss the impact of genetic testing on you and your family members, go over your family health history, and talk about potential screening and interventions.     Assessing Cancer Risk  Only about 5-10% of cancers are thought to be due to an inherited cancer susceptibility gene.  These families often have:    Several people with the same or related types of cancer    Cancers diagnosed at a young age (before age 50)    Individuals with more than one primary cancer    Multiple generations of the family affected with cancer    Some people may be candidates for genetic testing of more than one gene.  For these families, genetic testing using a multi-gene cancer panel may be offered.  These panels may test genes known to increase the risk for breast (and other) cancers: KODAK, BRCA1, BRCA2, CDH1, CHEK2, PALB2, PTEN, and TP53.  The purpose of this handout is to serve as a brief summary of the high/moderate-risk breast cancer genes which have published clinical  management guidelines for individuals who are found to carry a mutation.    BRCA1 and BRCA2-Hereditary Breast and Ovarian Cancer Syndrome (HBOC)  A single mutation in one of the copies of BRCA1 or BRCA2 increases the risk for breast and ovarian cancer, among others.  The risk for pancreatic cancer and melanoma may also be slightly increased in some families.  The tables below list the chance that someone with a BRCA mutation would develop cancer in his or her lifetime1,2,3,4.          Women   Men     General Population BRCA+    General Population BRCA+   Breast  12% 40-80%  Breast <1% ~7%   Ovarian  1-2% 10-40%  Prostate 16% 20%       A person s ethnic background is also important to consider, as individuals of Ashkenazi Sabianist ancestry have a higher chance of having a BRCA gene mutation.  There are three BRCA mutations that occur more frequently in this population.     Individuals who inherit two BRCA2 mutations--one from their mother and one from their father--have a condition called Fanconi Anemia.  This rare autosomal recessive condition is associated with short stature, developmental delay, bone marrow failure, and increased risk for childhood cancers.    TP53-Li-Fraumeni Syndrome (LFS)  LFS is a cancer predisposition syndrome. Individuals with LFS are at an increased risk for developing cancer at a young age. The general lifetime risk for development of cancer is 50% by age 30 and 90% by age 60.  LFS is caused by a mutation in the TP53 gene.  A single mutation in one of the copies of TP53 increases the risk for multiple cancers.     Core Cancers: Sarcomas, Breast, Brain, Lung, Leukemias/Lymphomas, Adrenocortical carcinomas  Other Cancers: Gastrointestinal, Thyroid, Skin, Genitourinary    PTEN-Cowden Syndrome  Cowden syndrome is a hereditary condition that increases the risk for breast, thyroid, endometrial, and kidney cancer.  Cowden syndrome is caused by a mutation in the PTEN gene.  A single mutation in one  of the copies of PTEN causes Cowden syndrome and increases cancer risk.  The table below shows the chance that someone with a PTEN mutation would develop cancer in their lifetime5,6.  Other benign features seen in some individuals with Cowden syndrome include benign skin lesions (facial papules, keratoses, lipomas), learning disability, autism, thyroid nodules, colon polyps, and larger head size.      Lifetime Cancer Risk   Cancer Type General Population Cowden Syndrome   Breast  12% 25-50%*   Thyroid  1% 35%   Renal 1-2% 35%   Endometrial  2-3% 28%   Colon 5% 9%   Melanoma 2-3% >5%     *One recent study found breast cancer risk to be increased to 85%    CDH1-Hereditary Diffuse Gastric Cancer (HDGC)  Currently, one gene is known to cause hereditary diffuse gastric cancer: CDH1.  Individuals with HDGC are at increased risk for diffuse gastric cancer and lobular breast cancer. Of people diagnosed with HDGC, 30-50% have a mutation in the CDH1 gene.  This suggests there are likely other genes that may cause HDGC that have not been identified yet.      Lifetime Cancer Risks    General Pop HDGC    Diffuse Gastric  <1% ~80%   Breast 12% 39-52%       Additional Genes Associated with Increased Breast Cancer Risk  PALB2  Mutations in PALB2 have been shown to increase the risk of breast cancer up to 33-58% in some families; where individuals fall within this risk range is dependent upon family history.9 PALB2 mutations have also been associated with increased risk for pancreatic cancer, although this risk has not been quantified yet.  Individuals who inherit two PALB2 mutations--one from their mother and one from their father--have a condition called Fanconi Anemia.  This rare autosomal recessive condition is associated with short stature, developmental delay, bone marrow failure, and increased risk for childhood cancers.    KODAK  KODAK is a moderate-risk breast cancer gene. Women who have a mutation in KODAK can have between a 2-4  fold increased risk for breast cancer compared to the general population.10 KODAK mutations have also been associated with increased risk for pancreatic cancer, however an estimate of this cancer risk is not well understood.11  Individuals who inherit two KODAK mutations have a condition called ataxia-telangiectasia (AT).  This rare autosomal recessive condition affects the nervous system and immune system, and is associated with progressive cerebellar ataxia beginning in childhood.  Individuals with ataxia-telangiectasia often have a weakened immune system and have an increased risk for childhood cancers.         CHEK2   CHEK2 is a moderate-risk breast cancer gene.  Women who have a mutation in CHEK2 have around a 2-fold increased risk for breast cancer compared to the general population, and this risk may be higher depending upon family history.12,13,14 Mutations in CHEK2 have also been shown to increase the risk of a number of other cancers, including colon and prostate, however these cancer risks are currently not well understood.      Inheritance   All of the genes reviewed above are inherited in an autosomal dominant pattern. This means that if a parent has a mutation, each of his or her children will have a 50% chance of inheriting that same mutation.  Therefore, each child--male or female--would have a 50% chance of being at increased risk for developing cancer.        Image obtained from Genetics Home Reference, 2013     In rare cases, there can be mutations in both copies of these genes (one from each parent), leading to different autosomal recessive conditions.  Mutations in some genes can occur de yissel, which means that a person s mutation occurred for the first time in them and was not inherited from a parent.  Now that they have the mutation, however, it can be passed on to future generations.     Genetic Testing  Genetic testing involves a blood test and will look for any harmful mutations within genes  that are associated with increased cancer risk.  If possible, it is recommended that the person(s) who has had cancer be tested before other family members.  That person will give us the most useful information about whether or not a specific gene is associated with the cancer in the family.    Results  There are three possible results of genetic testing:    Positive--a harmful mutation was identified in one or more of the genes    Negative--no mutation was identified in any of the genes on this panel    Variant of unknown significance--a variation in one of the genes was identified, but it is unclear how this impacts cancer risk in the family    Advantages and Disadvantages  There are advantages and disadvantages to genetic testing.  Advantages    May clarify your cancer risk    Can help you make medical decisions    May explain the cancers in your family    May give useful information to your family members (if you share your results)    Disadvantages    Possible negative emotional impact of learning about inherited cancer risk    Uncertainty in interpreting a negative test result in some situations    Possible genetic discrimination concerns (see below)    Genetic Information Nondiscrimination Act (JES)  JES is a federal law that protects individuals from health insurance or employment discrimination based on a genetic test result alone.  Although rare, there are currently no legal discrimination protections in terms of life insurance, long term care, or disability insurances. Visit the National Human Genome Research [place] genome.gov/65302689 to learn more.    Reducing Cancer Risk  Each of the genes listed within this handout have nationally recognized cancer screening guidelines that would be recommended for individuals who test positive.  In addition to increased cancer screening, surgeries may be offered or recommended to reduce cancer risk.  Recommendations are based upon an individual s genetic test  result as well as their personal and family history of cancer.    Questions to Think About Regarding Genetic Testing    What effect will the test result have on me and my relationship with my family members if I have an inherited gene mutation?  If I don t have a gene mutation?    Should I share my test results, and how will my family react to this news, which may also affect them?    Are my children ready to learn new information that may one day affect their own health?    Resources  FORCE: Facing Our Risk of Cancer Empowered facingourrisk.org   Bright Pink bebrightpink.org   Li-Fraumeni Syndrome Association lfsassociation.org   PTEN World PTENworld.natue   No stomach for cancer, Inc. nostomachforcancer.org   Stomach cancer relief network scrnet.org   Collaborative Group of the Americas on Inherited Colorectal Cancer (CGA) cgaicc.com    Cancer Care cancercare.org   American Cancer Society (ACS) cancer.org   National Cancer [place] (NCI) cancer.gov     Cancer Risk Management Program [phone] ([phone])  ? Dionte Bhakta, MS, [facility]  [phone]  ? Jazmyne Booth, MS, [facility]  [phone]  ? Rose Allen, MS, [facility]  [phone]  ? Chrissie Reese, MS, [facility]  [phone]  ? Taty Magaña, MS, [facility]  [phone]  ? Diane Mckeon, MS, [facility]  [phone]  ? Marie Mendoza, MS, [facility]  [phone]    References  1. Deandre Croft PDP, Lucero S, Leeanne CALIXTO, Arabella JE, Brendon JL, Jeronimo N, Pawel H, Veronika O, Leonor A, Joselo B, Erick P, Carlos S, Veronica DM, Ken N, Evelia E, Shantanu WILLIAMSON, Ck E, Roe J, Josh J, Tamiko B, Jay H, Leslaci S, Eerola H, Yordan H, Jennifer K, Dirk OP. Average risks of breast and ovarian cancer associated with BRCA1 or BRCA2 mutations detected in case series unselected for family history: a combined analysis of 222 studies. Am J Hum [place]ma. 2003;72:1117-30.  2. Alfonzo VERAS, Debo TORRES, Scott JEAN.  BRCA1 and BRCA2 Hereditary Breast and Ovarian Cancer. Gene Reviews online.  2013.  3. Isaiah YC, Jose Luis S, oMnique G, Gemma S. Breast cancer risk among male BRCA1 and BRCA2 mutation carriers. J Natl Cancer Inst. 2007;99:1811-4.  4. Wilmer GASPAR, Keith I, Jamarcus J, Steff E, Yoly ER, Melissa F. Risk of breast cancer in male BRCA2 carriers. J Med Palma. 2010;47:710-1.  5. Paco MH, Jhonathan J, Salome J, Belkys LA, Bhavani MS, Lauri C. Lifetime cancer risks in individuals with germline PTEN mutations. Clin Cancer Res. 2012;18:400-7.  6. Pilarski R. Cowden Syndrome: A Critical Review of the Clinical Literature. J Palma . 2009:18:13-27.  7. National Comprehensive Cancer Network. Clinical practice guidelines in oncology, colorectal cancer screening. Available online (registration required). 2013.  8. National Cancer [place]. SEER Cancer Stat Fact Sheets.  December 2013.  9. Anton BURK, et al. Breast-Cancer Risk in Families with Mutations in PALB2. NEJM. 2014; 371(6):497-506.  10. Chino A, Siddharth D, José Miguel S, Gin P, Nohemy T, Sandra M, Luciano B, Xiomara H, Carmen R, Marium K, Kaylynn L, Wilmer GASPAR, Veronica D, Indio DF, Suzanne MR, The Breast Cancer Susceptibility Collaboration (UK) & Richie VERAS. KODAK mutations that cause ataxia-telangiectasia are breast cancer susceptibility alleles. Nature Genetics. 2006;38:873-875  11. Tye N , Tracy Y, Ruth J, Pete L, Darlin GM , Cristhian ML, Gallinger S, Tenorio AG, Syngal S, Jackie ML, Patricia J , Gunnar R, Aretha SZ, León JR, Reina VE, Shila M, Vogelstein B, Lena N, Honey RH, Darlyn KW, and Rochelle AP. KODAK mutations in patients with hereditary pancreatic cancer. Cancer Discover. 2012;2:41-46  12. CHEK2 Breast Cancer Case-Control Consortium. CHEK2*1100delC and susceptibility to breast cancer: A collaborative analysis involving 10,860 breast cancer cases and 9,065 controls from 10 studies. Am J Hum [place], 74 (2004), pp. 6686-7633  13. Tien T, Héctor S, Demetrice STEVE, et al. Spectrum of Mutations in BRCA1, BRCA2, CHEK2, and TP53  in Families at High Risk of Breast Cancer. ERROL, 2006;295(12):3649-4314.   14. Radha TOPETE, Katherine D, Shabnam A, et al. Risk of breast cancer in women with a CHEK2 mutation with and without a family history of breast cancer. JClin Oncol. 2011;29:0446-6939.      TURNAROUND TIME  4 - 6 weeks    INSURANCE COVERAGE   A prior authorization will be submitted when your provider submits the order for this panel. Testing will be held until prior authorization is obtained. You will be contacted once prior authorization is completed. For details regarding coverage and out-of-pocket estimates, please contact a  at the [facility] ([facility]) at [phone].    About the [facility] ([facility]), [facility]  In collaborative effort with the [facility] and the [place] [facility], the [facility] offers a full range of diagnostic testing services, with a strong focus on quality, accuracy, and timeliness.

## 2020-11-18 NOTE — PATIENT INSTRUCTIONS
BREAST ACTIONABLE PANEL    The Breast Actionable cancer panel is a genetic test which analyzes 11 genes known to be associated with an increased lifetime risk of breast and other cancers. Published medical guidelines exist for detection, prevention, risk reduction, and/or treatment for individuals with mutations in these genes.     Of note, the Breast Actionable cancer panel is not considered to be comprehensive. Individuals with a family history of other cancers should be seen by a genetic counselor to discuss the family history and the possibility of expanded genetic testing.     GENES (11) ASSOCIATED CANCER(S) ASSOCIATED CANCER SYNDROME(S)   KODAK breast, pancreas    BRCA1 breast, ovary, pancreas, melanoma, prostate, male breast Hereditary breast /ovarian cancer syndrome (HBOC)   BRCA2 breast, ovary, pancreas, melanoma, prostate, male breast Hereditary breast /ovarian cancer syndrome (HBOC)   CDH1 breast, stomach, colon Hereditary diffuse gastric cancer   CHEK2 breast, colon, prostate    NBN breast, ovary, prostate    NF1 breast, brain, peripheral nervous system Neurofibromatosis 1   PALB2 breast, pancreas    PTEN breast, thyroid, uterus, colon, kidney Cowden syndrome, Tugnefla-Ligqb-Pxryqzhkl syndrome (BRRS), PTEN-related Proteus syndrome (PS), Proteus-like syndrome   STK11 breast, ovary, colon, pancreas, stomach, uterus, cervix Peutz-Jeghers syndrome   TP53 breast, bone, brain, soft tissues, adrenal cortex Li-Fraumeni syndrome     Meeting with a Genetic Counselor  After you receive the results of the Breast Actionable Panel testing, providers will often refer you to see a genetic counselor. This is because patients can have a family history of cancer other than breast cancer and may benefit from a discussion about comprehensive, expanded genetic testing.     Additionally, you and the genetic counselor will discuss the impact of genetic testing on you and your family members, go over your family health  history, and talk about potential screening and interventions.     Assessing Cancer Risk  Only about 5-10% of cancers are thought to be due to an inherited cancer susceptibility gene.  These families often have:    Several people with the same or related types of cancer    Cancers diagnosed at a young age (before age 50)    Individuals with more than one primary cancer    Multiple generations of the family affected with cancer    Some people may be candidates for genetic testing of more than one gene.  For these families, genetic testing using a multi-gene cancer panel may be offered.  These panels may test genes known to increase the risk for breast (and other) cancers: KODAK, BRCA1, BRCA2, CDH1, CHEK2, PALB2, PTEN, and TP53.  The purpose of this handout is to serve as a brief summary of the high/moderate-risk breast cancer genes which have published clinical management guidelines for individuals who are found to carry a mutation.    BRCA1 and BRCA2-Hereditary Breast and Ovarian Cancer Syndrome (HBOC)  A single mutation in one of the copies of BRCA1 or BRCA2 increases the risk for breast and ovarian cancer, among others.  The risk for pancreatic cancer and melanoma may also be slightly increased in some families.  The tables below list the chance that someone with a BRCA mutation would develop cancer in his or her lifetime1,2,3,4.          Women   Men     General Population BRCA+    General Population BRCA+   Breast  12% 40-80%  Breast <1% ~7%   Ovarian  1-2% 10-40%  Prostate 16% 20%       A person s ethnic background is also important to consider, as individuals of Ashkenazi Denominational ancestry have a higher chance of having a BRCA gene mutation.  There are three BRCA mutations that occur more frequently in this population.     Individuals who inherit two BRCA2 mutations--one from their mother and one from their father--have a condition called Fanconi Anemia.  This rare autosomal recessive condition is associated with  short stature, developmental delay, bone marrow failure, and increased risk for childhood cancers.    TP53-Li-Fraumeni Syndrome (LFS)  LFS is a cancer predisposition syndrome. Individuals with LFS are at an increased risk for developing cancer at a young age. The general lifetime risk for development of cancer is 50% by age 30 and 90% by age 60.  LFS is caused by a mutation in the TP53 gene.  A single mutation in one of the copies of TP53 increases the risk for multiple cancers.     Core Cancers: Sarcomas, Breast, Brain, Lung, Leukemias/Lymphomas, Adrenocortical carcinomas  Other Cancers: Gastrointestinal, Thyroid, Skin, Genitourinary    PTEN-Cowden Syndrome  Cowden syndrome is a hereditary condition that increases the risk for breast, thyroid, endometrial, and kidney cancer.  Cowden syndrome is caused by a mutation in the PTEN gene.  A single mutation in one of the copies of PTEN causes Cowden syndrome and increases cancer risk.  The table below shows the chance that someone with a PTEN mutation would develop cancer in their lifetime5,6.  Other benign features seen in some individuals with Cowden syndrome include benign skin lesions (facial papules, keratoses, lipomas), learning disability, autism, thyroid nodules, colon polyps, and larger head size.      Lifetime Cancer Risk   Cancer Type General Population Cowden Syndrome   Breast  12% 25-50%*   Thyroid  1% 35%   Renal 1-2% 35%   Endometrial  2-3% 28%   Colon 5% 9%   Melanoma 2-3% >5%     *One recent study found breast cancer risk to be increased to 85%    CDH1-Hereditary Diffuse Gastric Cancer (HDGC)  Currently, one gene is known to cause hereditary diffuse gastric cancer: CDH1.  Individuals with HDGC are at increased risk for diffuse gastric cancer and lobular breast cancer. Of people diagnosed with HDGC, 30-50% have a mutation in the CDH1 gene.  This suggests there are likely other genes that may cause HDGC that have not been identified yet.      Lifetime  Cancer Risks    General Pop [facility]    Diffuse Gastric  <1% ~80%   Breast 12% 39-52%       Additional Genes Associated with Increased Breast Cancer Risk  PALB2  Mutations in PALB2 have been shown to increase the risk of breast cancer up to 33-58% in some families; where individuals fall within this risk range is dependent upon family history.9 PALB2 mutations have also been associated with increased risk for pancreatic cancer, although this risk has not been quantified yet.  Individuals who inherit two PALB2 mutations--one from their mother and one from their father--have a condition called Fanconi Anemia.  This rare autosomal recessive condition is associated with short stature, developmental delay, bone marrow failure, and increased risk for childhood cancers.    KODAK  KODAK is a moderate-risk breast cancer gene. Women who have a mutation in KODAK can have between a 2-4 fold increased risk for breast cancer compared to the general population.10 KODAK mutations have also been associated with increased risk for pancreatic cancer, however an estimate of this cancer risk is not well understood.11  Individuals who inherit two KODAK mutations have a condition called ataxia-telangiectasia (AT).  This rare autosomal recessive condition affects the nervous system and immune system, and is associated with progressive cerebellar ataxia beginning in childhood.  Individuals with ataxia-telangiectasia often have a weakened immune system and have an increased risk for childhood cancers.         CHEK2   CHEK2 is a moderate-risk breast cancer gene.  Women who have a mutation in CHEK2 have around a 2-fold increased risk for breast cancer compared to the general population, and this risk may be higher depending upon family history.12,13,14 Mutations in CHEK2 have also been shown to increase the risk of a number of other cancers, including colon and prostate, however these cancer risks are currently not well understood.      Inheritance   All of  the genes reviewed above are inherited in an autosomal dominant pattern. This means that if a parent has a mutation, each of his or her children will have a 50% chance of inheriting that same mutation.  Therefore, each child--male or female--would have a 50% chance of being at increased risk for developing cancer.        Image obtained from Genetics Home Reference, 2013     In rare cases, there can be mutations in both copies of these genes (one from each parent), leading to different autosomal recessive conditions.  Mutations in some genes can occur de yissel, which means that a person s mutation occurred for the first time in them and was not inherited from a parent.  Now that they have the mutation, however, it can be passed on to future generations.     Genetic Testing  Genetic testing involves a blood test and will look for any harmful mutations within genes that are associated with increased cancer risk.  If possible, it is recommended that the person(s) who has had cancer be tested before other family members.  That person will give us the most useful information about whether or not a specific gene is associated with the cancer in the family.    Results  There are three possible results of genetic testing:    Positive--a harmful mutation was identified in one or more of the genes    Negative--no mutation was identified in any of the genes on this panel    Variant of unknown significance--a variation in one of the genes was identified, but it is unclear how this impacts cancer risk in the family    Advantages and Disadvantages  There are advantages and disadvantages to genetic testing.  Advantages    May clarify your cancer risk    Can help you make medical decisions    May explain the cancers in your family    May give useful information to your family members (if you share your results)    Disadvantages    Possible negative emotional impact of learning about inherited cancer risk    Uncertainty in  interpreting a negative test result in some situations    Possible genetic discrimination concerns (see below)    Genetic Information Nondiscrimination Act (JES)  JES is a federal law that protects individuals from health insurance or employment discrimination based on a genetic test result alone.  Although rare, there are currently no legal discrimination protections in terms of life insurance, long term care, or disability insurances. Visit the National Human Genome Research [place] genome.gov/71101026 to learn more.    Reducing Cancer Risk  Each of the genes listed within this handout have nationally recognized cancer screening guidelines that would be recommended for individuals who test positive.  In addition to increased cancer screening, surgeries may be offered or recommended to reduce cancer risk.  Recommendations are based upon an individual s genetic test result as well as their personal and family history of cancer.    Questions to Think About Regarding Genetic Testing    What effect will the test result have on me and my relationship with my family members if I have an inherited gene mutation?  If I don t have a gene mutation?    Should I share my test results, and how will my family react to this news, which may also affect them?    Are my children ready to learn new information that may one day affect their own health?    Resources  FORCE: Facing Our Risk of Cancer Empowered facingourrisk.org   Bright Pink bebrightpink.org   Li-Fraumeni Syndrome Association lfsassociation.org   PTEN World PTENworld.Care Technology Systems   No stomach for cancer, Inc. nostomachforcancer.org   Stomach cancer relief network scrnet.org   Collaborative Group of the Americas on Inherited Colorectal Cancer (CGA) cgaicc.com    Cancer Care cancercare.org   American Cancer Society (ACS) cancer.org   National Cancer [place] (NCI) cancer.gov     Cancer Risk Management Program [phone]-[facility] ([phone])  ? Dionte Bhakta MS,  [facility]  [phone]  ? Jazmyne Booth, MS, [facility]  [phone]  ? Rose Allen, MS, [facility]  [phone]  ? Chrissie Reese, MS, [facility]  [phone]  ? Taty Magaña, MS, [facility]  [phone]  ? Diane Mckeon, MS, [facility]  [phone]  ? Marie Mendoza, MS, [facility]  [phone]    References  1. Anton LOVE, Deandre PDP, Narod S, Risch CALIXTO, Arabella JE, Brendon JL, Jeronimo N, Pawel H, Veronika O, Leonor A, Joselo B, Erick P, Carlos S, Veronica DM, Ken N, Evelia E, Shantanu H, Ck E, Roe J, Josh J, Tamiko B, Tulinius H, Thorlacius S, Eerola H, Yordan H, Jennifer K, Dirk OP. Average risks of breast and ovarian cancer associated with BRCA1 or BRCA2 mutations detected in case series unselected for family history: a combined analysis of 222 studies. Am J Hum Palma. 2003;72:1117-30.  2. Alfonzo N, Debo M, Scott G.  BRCA1 and BRCA2 Hereditary Breast and Ovarian Cancer. Gene Reviews online. 2013.  3. Isaiah YC, Jose Luis S, Monique G, Menendez S. Breast cancer risk among male BRCA1 and BRCA2 mutation carriers. J Natl Cancer Inst. 2007;99:1811-4.  4. Wilmer GASPAR, Keith I, Jamarcus J, Steff E, Yoly ER, Melissa F. Risk of breast cancer in male BRCA2 carriers. J Med Palma. 2010;47:710-1.  5. Paco MH, Jhonathan J, Salome J, Belkys LOBO, Bhavani MS, Eng C. Lifetime cancer risks in individuals with germline PTEN mutations. Clin Cancer Res. 2012;18:400-7.  6. Lindaki R. Cowden Syndrome: A Critical Review of the Clinical Literature. J Palma . 2009:18:13-27.  7. National Comprehensive Cancer Network. Clinical practice guidelines in oncology, colorectal cancer screening. Available online (registration required). 2013.  8. National Cancer [place]. SEER Cancer Stat Fact Sheets.  December 2013.  9. Anton BURK et al. Breast-Cancer Risk in Families with Mutations in PALB2. NEJM. 2014; 371(6):497-506.  10. [place] LOVE, Siddharth D, José Miguel S, Gin P, Nohemy T, Sandra M, Luciano B, Xiomara H, Carmen R, Marium K, Kaylynn TURNER, Wilmer GASPAR, Veronica  D, [place] DF, Suzanne MR, The Breast Cancer Susceptibility Collaboration (UK) & Richie VERAS. KODAK mutations that cause ataxia-telangiectasia are breast cancer susceptibility alleles. Nature Genetics. 2006;38:873-875  11. Tye N , Tracy Y, Ruth J, Pete L, Darlin GM , Cristhian ML, Erin S, Tenorio AG, Syngal S, Jackie ML, Patricia J , Gunnar R, Aretha SZ, León JR, Reina VE, Shila M, Voyissel B, Lena N, Honey RH, Darlyn KW, and Rochelle AP. KODAK mutations in patients with hereditary pancreatic cancer. Cancer Discover. 2012;2:41-46  12. CHEK2 Breast Cancer Case-Control Consortium. CHEK2*1100delC and susceptibility to breast cancer: A collaborative analysis involving 10,860 breast cancer cases and 9,065 controls from 10 studies. Am J Hum Palma, 74 (2004), pp. 4345-8085  13. Tien T, Héctor S, Demetrice K, et al. Spectrum of Mutations in BRCA1, BRCA2, CHEK2, and TP53 in Families at High Risk of Breast Cancer. ERROL, 2006;295(12):1431-6544.   14. Radha C, Katherine D, Shabnam A, et al. Risk of breast cancer in women with a CHEK2 mutation with and without a family history of breast cancer. JClin Oncol. 2011;29:9419-4045.      TURNAROUND TIME  4 - 6 weeks    INSURANCE COVERAGE   A prior authorization will be submitted when your provider submits the order for this panel. Testing will be held until prior authorization is obtained. You will be contacted once prior authorization is completed. For details regarding coverage and out-of-pocket estimates, please contact a  at the [facility] at [phone].    About the [facility]), [facility]  In collaborative effort with the [facility] and the [place] [employer], the [facility] offers a full range of diagnostic testing services, with a strong focus on quality, accuracy, and timeliness.

## 2020-11-18 NOTE — PROGRESS NOTES
"11/18/2020    Radha Alcala is a 50 year old female who is being evaluated via a billable video visit.      The patient has been notified of following:     \"This video visit will be conducted via a call between you and your physician/provider. We have found that certain health care needs can be provided without the need for an in-person physical exam.  This service lets us provide the care you need with a video conversation.  If a prescription is necessary we can send it directly to your pharmacy.  If lab work is needed we can place an order for that and you can then stop by our lab to have the test done at a later time.    Video visits are billed at different rates depending on your insurance coverage.  Please reach out to your insurance provider with any questions.    If during the course of the call the physician/provider feels a video visit is not appropriate, you will not be charged for this service.\"    Patient has given verbal consent for Video visit? Yes    Video-Visit Details    Type of service:  Video Visit    Video Start Time:  07:57 am   Video End Time: 08:41 am    Originating Location (pt. Location): Home    Distant Location (provider location): Cancer Risk Management Program    Platform used for Video Visit: [facility]    Referring Provider: Kirby Alexander MD    Presenting Information:   I spoke with Radha Alcala over video today for genetic counseling to discuss her personal and family history of cancer. With her permission, this appointment was conducted over video due to COVID-19 precautions. We talked today to review this history, cancer screening recommendations, and available genetic testing options.    Personal History:  Radha is a 50 year old female. She was recently diagnosed with breast cancer at age 50 (right breast, IDC, ER positive, WI positive, HER2 positive). She is currently planning for lumpectomy. She states that her genetic testing results would potentially influence her final " surgical decision. She has already had some genetic testing ordered by Dr. Alexander. The Breast Actionable Panel offered by the [facility] at [facility] is currently pending.     She also reports a history of skin cancer (non-melanoma) on her face.    She had her first menstrual period at age 11 or 12, her first child at age 30, and menopausal status is currently unknown (she just had IUD removed). Radha has her ovaries, fallopian tubes and uterus in place. She reports that she has not used hormone replacement therapy. She has used oral contraceptives and an IUD. Her most recent colonoscopy on 1/15/19 detected no polyps and follow-up was recommended in 5 years. Radha reported no history of tobacco use and alcohol use of 1-2 drinks per week.    Family History: (Please see scanned pedigree for detailed family history information)  Siblings:  She reports no known history of cancer in any of her siblings. She believes that one brother and one sister have had colon polyps.  Maternal:  Her mother was diagnosed with colon cancer at age 81 and passed away in her 80s.  One maternal cousin is in her mid 60s and was diagnosed with breast cancer at age 46.  Another maternal cousin was diagnosed with breast cancer at an unknown age and passed away at age 76.  Paternal:  Her father was diagnosed with prostate cancer in his early 70s. She does not think this was an aggressive prostate cancer. He passed away at age 80.   Her paternal grandfather was diagnosed with brain cancer at age 70 and passed away in his 70s.    Her maternal ethnicity is [place], [place]. Her paternal ethnicity is [place], [place]. There is no known Ashkenazi Anabaptist ancestry on either side of her family.     Discussion:    Radha's personal and family history of cancer is suggestive of a hereditary cancer syndrome.    We reviewed the features of sporadic, familial, and hereditary cancers. In looking at Radha's family history, it is  possible that a cancer susceptibility gene is present due to her recent diagnosis of breast cancer and her maternal family history of two cousins with breast cancer, one diagnosed under age 50.    We discussed the natural history and genetics of breast cancer. We discussed the BRCA1 and BRCA2 genes. Mutations in these genes cause a condition known as Hereditary Breast and Ovarian Cancer syndrome (HBOC). Women with a mutation in either of these genes are at increased risk for breast and ovarian cancer. There is also an increased risk for a second primary breast cancer. Men with a mutation in either of these genes are at increased risk for breast and prostate cancer. Both women and men may also be at increased risk for pancreatic cancer and melanoma.     We also reviewed other genes that are on the Breast Actionable Panel. A detailed handout regarding these genes will be provided to Radha via [employer] and can be found in the after visit summary. Topics included: inheritance pattern, cancer risks, cancer screening recommendations, and also risks, benefits and limitations of testing.    Based on her personal and family history, Radha meets current National Comprehensive Cancer Network (NCCN) criteria for genetic testing of high-penetrance breast and/or ovarian cancer susceptibility genes, which often includes BRCA1, BRCA2, CDH1, PALB2, PTEN, and TP53 among others.     We discussed that there are additional genes that could cause increased risk for breast cancer. As many of these genes present with overlapping features in a family and accurate cancer risk cannot always be established based upon the pedigree analysis alone, it would be reasonable for Radha to consider panel genetic testing to analyze multiple genes at once. The Breast Actionable Panel has already been ordered and results are currently pending.    We briefly reviewed additional genetic testing options including the Breast Cancer Expanded Panel or larger  panel options that would include genes associated with other types of cancer. She would like to think more about these reflex options while she waits for the results of the Breast Actionable Panel. We will discuss further testing options again when results of this panel are available.    Medical Management: For Radha, we reviewed that the information from genetic testing may determine:    surgery to treat Radha's active cancer diagnosis (i.e. lumpectomy versus bilateral mastectomy),    additional cancer screening for which Radha may qualify (i.e. mammogram and breast MRI, more frequent colonoscopies, more frequent dermatologic exams, etc.),    options for risk reducing surgeries Radha could consider (i.e. bilateral mastectomy, surgery to remove her ovaries and/or uterus, etc.),      and targeted chemotherapies for Radha's active cancer, or if she were to develop certain cancers in the future (i.e. immunotherapy for individuals with Mckeon syndrome, PARP inhibitors, etc.).     These recommendations will be discussed in detail once genetic testing is completed.     Plan:  1) No additional genetic testing was ordered today. She is waiting for the results of the Breast Actionable Panel.   2) She will be contacted when these results are available for review. We will further discuss additional testing options at that time.  3) She was encouraged to contact me with any additional questions or concerns.    Time spent over video: 44 minutes    Taty Magaña MS, [facility]  Licensed, Certified Genetic Counselor  Office: [phone]  Email: [email or web address]

## 2020-11-19 PROBLEM — C50.911 INFILTRATING DUCTAL CARCINOMA OF RIGHT BREAST (H): Status: ACTIVE | Noted: 2020-11-12

## 2020-11-19 PROBLEM — Z17.0 MALIGNANT NEOPLASM OF UPPER-OUTER QUADRANT OF RIGHT BREAST IN FEMALE, ESTROGEN RECEPTOR POSITIVE (H): Status: ACTIVE | Noted: 2020-11-19

## 2020-11-23 ENCOUNTER — MYC MEDICAL ADVICE (OUTPATIENT)
Dept: ONCOLOGY | Facility: CLINIC | Age: 50
End: 2020-11-23

## 2020-11-23 NOTE — TELEPHONE ENCOUNTER
11/23/20    Radha sent a [employer] message with updates about her family history (please see MyChart encounter dated 11/13/20). Pedigree to be scanned has been updated with this new information. She requested that this information also be sent to Dr. Nino, which will be done.     Taty Magaña MS, [facility]  Licensed, Certified Genetic Counselor  Office: [phone]  Email: [email or web address]

## 2020-11-25 PROCEDURE — 999N001032 HC STATISTIC REVIEW OUTSIDE SLIDES TC 88321: Performed by: SURGERY

## 2020-11-25 PROCEDURE — 88321 CONSLTJ&REPRT SLD PREP ELSWR: CPT | Performed by: PATHOLOGY

## 2020-11-30 ENCOUNTER — MYC MEDICAL ADVICE (OUTPATIENT)
Dept: ONCOLOGY | Facility: CLINIC | Age: 50
End: 2020-11-30

## 2020-12-01 LAB — COPATH REPORT: NORMAL

## 2020-12-17 DIAGNOSIS — C50.919 INFILTRATING DUCTAL CARCINOMA OF BREAST (H): Primary | ICD-10-CM

## 2020-12-17 LAB — COPATH REPORT: NORMAL

## 2020-12-17 PROCEDURE — 81321 PTEN GENE FULL SEQUENCE: CPT | Performed by: SURGERY

## 2020-12-17 PROCEDURE — 81479 UNLISTED MOLECULAR PATHOLOGY: CPT | Performed by: SURGERY

## 2020-12-17 PROCEDURE — 81405 MOPATH PROCEDURE LEVEL 6: CPT | Performed by: SURGERY

## 2020-12-17 PROCEDURE — 81307 PALB2 GENE FULL GENE SEQ: CPT | Performed by: SURGERY

## 2020-12-17 PROCEDURE — 81408 MOPATH PROCEDURE LEVEL 9: CPT | Performed by: SURGERY

## 2020-12-17 PROCEDURE — 81323 PTEN GENE DUP/DELET VARIANT: CPT | Performed by: SURGERY

## 2020-12-17 PROCEDURE — 81162 BRCA1&2 GEN FULL SEQ DUP/DEL: CPT | Performed by: SURGERY

## 2020-12-17 PROCEDURE — 81406 MOPATH PROCEDURE LEVEL 7: CPT | Performed by: SURGERY

## 2020-12-17 PROCEDURE — G0452 MOLECULAR PATHOLOGY INTERPR: HCPCS | Mod: 26 | Performed by: PATHOLOGY

## 2020-12-21 ENCOUNTER — DOCUMENTATION ONLY (OUTPATIENT)
Dept: ONCOLOGY | Facility: CLINIC | Age: 50
End: 2020-12-21

## 2020-12-21 NOTE — PROGRESS NOTES
Patient went elsewhere for surgery. Per RN, cancel surgery. Completed. No need to call patient per RN.

## 2021-01-03 NOTE — PROGRESS NOTES
Oncology Visit:  Date on this visit: 1/5/2020    Diagnosis: ER positive, HER2 positive, grade 2, 6 mm right breast cancer.    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  Routine screening mammogram on 10/12/2020 showed a spiculated mass at 11:00, posterior depth of the right breast.  Ultrasound confirmed a mass measuring 5 mm at 11:00, 6 cm from the nipple and normal appearing lymph nodes in the axilla.  Right breast biopsy on 10/22/2020 was consistent with a grade 2 invasive ductal carcinoma with associated DCIS.  There was a focus suspicious for angiolymphatic invasion.  ER was moderate in 70% and TN weak in 5%,  HER-2 was equivocal by IHC and amplified by FISH with approximately 16.4 HER2 signals per cell and a HER2/CEP17 ratio of 6.8.     She underwent right breast lumpectomy and right axillary sentinel lymph node biopsy on 12/18/2020.  Final pathology showed a grade 2 invasive ductal carcinoma measuring 6 mm.  There was associated intermediate grade DCIS.  Surgical margins were negative.  Two sentinel lymph nodes were negative for malignancy.    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  Her surgery was approximately 2.5 weeks ago.  It went well for her.  She reports some firmness at the lumpectomy site.  She has been wearing a compressive sports bra.  She denies erythema, pain, or swelling.  She denies discomfort, pain, or numbness of the axillar.  She has full ROM of her right upper extremity.  She has mild tightness of the right lateral chest wall with full extension of the arm.  She continues to perform stretching exercises for this.  Energy level is at baseline.  She is performing most of her normal daily activities.  She continues to exercise on a regular  basis.  She has refrained from some yoga positions due to the recent surgery.  The remainder of a complete 12 point review of systems was reviewed with the patient and was negative with the exception of that mentioned above.    Past Medical/Surgical History:  1.  Breast cancer as per HPI  2.  Rosacea  3.  Recurrent sinusitis    Allergies:  Allergies as of 01/05/2021     (No Known Allergies)     Current Medications:  Current Outpatient Medications   Medication Sig Dispense Refill     levonorgestrel (MIRENA) 20 MCG/24HR IUD 1 each by Intrauterine route once       multivitamin w/minerals (MULTI-VITAMIN) tablet Take 1 tablet by mouth daily        Family and Social History:  Please see initial consultation dated 11/17/2020 for details.      Physical Exam:  General:  Well appearing adult female in NAD.  Eyes:  No erythema or discharge.  Respiratory:  Breathing comfortably on room air.  No wheezing or distress.  Breast:  Right upper outer breast incision with overlying steri strip appears to be healing well without erythema or edema.  Musculoskeletal:  Full ROM of the bilateral upper extremities.  Skin:  No visible concerning skin rashes or lesions  Neuro:  No notable tremor and dyskinetic movements.  Psych:  Mood and affect appear normal.    The rest of a comprehensive physical examination is deferred due to [facility] (public health emergency) video visit restrictions.    Laboratory/Imaging Studies  12/18/2020 Right breast lumpectomy and right axillary sentinel lymph node biopsy:  - invasive ductal carcinoma, grade 2, measuring 6 mm  - negative surgical margins  - Ki-67 = 30%  - DCIS intermediate grade measuring 7 mm  - two right axillary sentinel lymph nodes negative for malignancy.    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      1.  Right breast cancer:  We  reviewed the pathology from her recent surgery which showed a 6 mm breast cancer.  Surgical margins were Prognostic staging is thereby [id number] or stage Ia.      We then discussed the risks and benefits of chemotherapy.  We discussed the literature gives a wide range of estimates regarding risk of recurrence for a T1bN0, HER2 positive breast cancer.  Based on review of this literature, I estimate her 5-10 year risk of recurrence without a combination of chemo- and HER2 targeted therapy to be approximately 10-15%.  A course of chemo-/HER2 targeted therapy would reduce this risk of recurrence by approximately 1/3.  I therefore estimate that chemotherapy offers an absolute benefit of a 3-5% reduction in risk of recurrence.  My recommendation for treatment in this scenario is Taxol and Herceptin administered IV weekly x 12 weeks.  We reviewed the potential side effects of chemo- and HER2 targeted therapy including alopecia, fatigue, nausea, diarrhea, low blood counts, increased risk of infection, neuropathy, and cardiac toxicity.  We discussed typically chemotherapy would be administered prior to radiation therapy.  After completion of the initial 12 weeks of Taxol and Herceptin, plan would be to continue Herceptin alone once every 3 weeks to complete 1 year total of Herceptin.      She inquired about treatment with Herceptin without chemotherapy.  The benefits of this is less well studied, but we discussed this as an option as well.  In this case, Herceptin would be administered once every 3 weeks for a total of 1 year (17 total treatments).  Herceptin alone could be administered concurrently with radiation therapy.  Of note, she has met with Radiation Oncology at the [facility] and has been offered 3-day partial breast irradiation.    Upon completion of radiation, the plan would be to start endocrine therapy.  Estradiol and FSH levels in 11/2020 indicated patient is kate-menopausal.  Therefore I would recommend  initial treatment with tamoxifen.  Plan would be to treat with tamoxifen until 1 year without a menstrual bleed (or if she went a year from when the IUD was removed without a menstrual bleed).  We would recheck hormone levels at that time and if within postmenopausal range, would change from tamoxifen to letrozole. My recommendation will be for treatment with endocrine therapy for up to 10 years.  We reviewed potential side effects of endocrine therapy including joint stiffness, hot flashes, vaginal dryness, and mood disorder.    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  She still needs to consider treatment with Taxol.  She states she will think it over this week and let us know whether or not she would like to proceed with the chemotherapy portion of treatment.  We discussed consideration of a port-a-catheter for the IV therapies.  A port allows central venous access and can be placed in a day procedure by interventional radiology.  We discussed Herceptin +/- Taxol can be administered via a peripheral vein, however, given the number of anticipated infusions it is reasonable to consider a port.    At this time plan is to proceed with an echocardiogram.  She will consider Taxol and Herceptin vs Herceptin alone and notify us of her decision within one week.    Dr. Alcala had multiple questions which I answered to the best of my ability today.  I spent a total of 45 minutes in video visit with her today.  An additional 25 minutes was spent reviewing labs and pathology and in documentation.

## 2021-01-05 ENCOUNTER — VIRTUAL VISIT (OUTPATIENT)
Dept: ONCOLOGY | Facility: CLINIC | Age: 51
End: 2021-01-05
Attending: INTERNAL MEDICINE
Payer: COMMERCIAL

## 2021-01-05 DIAGNOSIS — Z17.0 MALIGNANT NEOPLASM OF UPPER-OUTER QUADRANT OF RIGHT BREAST IN FEMALE, ESTROGEN RECEPTOR POSITIVE (H): Primary | ICD-10-CM

## 2021-01-05 DIAGNOSIS — C50.411 MALIGNANT NEOPLASM OF UPPER-OUTER QUADRANT OF RIGHT BREAST IN FEMALE, ESTROGEN RECEPTOR POSITIVE (H): Primary | ICD-10-CM

## 2021-01-05 DIAGNOSIS — Z51.11 ENCOUNTER FOR ANTINEOPLASTIC CHEMOTHERAPY: ICD-10-CM

## 2021-01-05 PROCEDURE — 99215 OFFICE O/P EST HI 40 MIN: CPT | Mod: 95 | Performed by: INTERNAL MEDICINE

## 2021-01-05 PROCEDURE — 999N001193 HC VIDEO/TELEPHONE VISIT; NO CHARGE

## 2021-01-05 NOTE — PROGRESS NOTES
"Gayla Alcala is a 50 year old female who is being evaluated via a billable video visit.      How would you like to obtain your AVS? MyChart  If the video visit is dropped, the invitation should be resent by: Send to e-mail at: [email or web address]  Will anyone else be joining your video visit? No    Vitals - Patient Reported  Weight (Patient Reported): 61.2 kg (135 lb)  Height (Patient Reported): 172.7 cm (5' 8\")  BMI (Based on Pt Reported Ht/Wt): 20.53  Pain Score: No Pain (0)    Jennyfer Gunn CMA January 5, 2021  12:34 PM         Video-Visit Details    Type of service:  Video Visit    Total time of video visit:  45 minutes.  An additional 25 minutes was spent reviewing labs and pathology an in documentation.    Originating Location (pt. Location): Home    Distant Location (provider location):  [facility]     Platform used for Video Visit: Rohan  " RN phoned pt  Pt informed that boric acid suppositories have been approved by Dr Hatfield in Dr Hanson's absence  Pt appreciated call  RN confirmed with [facility] that they have a supply of medication  Encounter closed

## 2021-01-05 NOTE — LETTER
1/5/2021         RE: Radha Alcala  [address] [facility] [address]        Dear Colleague,    Thank you for referring your patient, Radha Alcala, to the [facility]. Please see a copy of my visit note below.    Oncology Visit:  Date on this visit: 1/5/2020    Diagnosis: ER positive, HER2 positive, grade 2, 6 mm right breast cancer.    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  Routine screening mammogram on 10/12/2020 showed a spiculated mass at 11:00, posterior depth of the right breast.  Ultrasound confirmed a mass measuring 5 mm at 11:00, 6 cm from the nipple and normal appearing lymph nodes in the axilla.  Right breast biopsy on 10/22/2020 was consistent with a grade 2 invasive ductal carcinoma with associated DCIS.  There was a focus suspicious for angiolymphatic invasion.  ER was moderate in 70% and IN weak in 5%,  HER-2 was equivocal by IHC and amplified by FISH with approximately 16.4 HER2 signals per cell and a HER2/CEP17 ratio of 6.8.     She underwent right breast lumpectomy and right axillary sentinel lymph node biopsy on 12/18/2020.  Final pathology showed a grade 2 invasive ductal carcinoma measuring 6 mm.  There was associated intermediate grade DCIS.  Surgical margins were negative.  Two sentinel lymph nodes were negative for malignancy.    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  Her surgery was approximately 2.5 weeks ago.  It went well for her.  She reports some firmness at the lumpectomy site.  She has been wearing a compressive sports bra.  She denies erythema, pain, or swelling.  She denies discomfort, pain, or numbness of the axillar.  She has full ROM of her right upper extremity.  She has mild  tightness of the right lateral chest wall with full extension of the arm.  She continues to perform stretching exercises for this.  Energy level is at baseline.  She is performing most of her normal daily activities.  She continues to exercise on a regular basis.  She has refrained from some yoga positions due to the recent surgery.  The remainder of a complete 12 point review of systems was reviewed with the patient and was negative with the exception of that mentioned above.    Past Medical/Surgical History:  1.  Breast cancer as per HPI  2.  Rosacea  3.  Recurrent sinusitis    Allergies:  Allergies as of 01/05/2021     (No Known Allergies)     Current Medications:  Current Outpatient Medications   Medication Sig Dispense Refill     levonorgestrel (MIRENA) 20 MCG/24HR IUD 1 each by Intrauterine route once       multivitamin w/minerals (MULTI-VITAMIN) tablet Take 1 tablet by mouth daily        Family and Social History:  Please see initial consultation dated 11/17/2020 for details.      Physical Exam:  General:  Well appearing adult female in NAD.  Eyes:  No erythema or discharge.  Respiratory:  Breathing comfortably on room air.  No wheezing or distress.  Breast:  Right upper outer breast incision with overlying steri strip appears to be healing well without erythema or edema.  Musculoskeletal:  Full ROM of the bilateral upper extremities.  Skin:  No visible concerning skin rashes or lesions  Neuro:  No notable tremor and dyskinetic movements.  Psych:  Mood and affect appear normal.    The rest of a comprehensive physical examination is deferred due to [facility] (public health emergency) video visit restrictions.    Laboratory/Imaging Studies  12/18/2020 Right breast lumpectomy and right axillary sentinel lymph node biopsy:  - invasive ductal carcinoma, grade 2, measuring 6 mm  - negative surgical margins  - Ki-67 = 30%  - DCIS intermediate grade measuring 7 mm  - two right axillary sentinel lymph nodes negative for  malignancy.    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      1.  Right breast cancer:  We reviewed the pathology from her recent surgery which showed a 6 mm breast cancer.  Surgical margins were Prognostic staging is thereby [id number] or stage Ia.      We then discussed the risks and benefits of chemotherapy.  We discussed the literature gives a wide range of estimates regarding risk of recurrence for a T1bN0, HER2 positive breast cancer.  Based on review of this literature, I estimate her 5-10 year risk of recurrence without a combination of chemo- and HER2 targeted therapy to be approximately 10-15%.  A course of chemo-/HER2 targeted therapy would reduce this risk of recurrence by approximately 1/3.  I therefore estimate that chemotherapy offers an absolute benefit of a 3-5% reduction in risk of recurrence.  My recommendation for treatment in this scenario is Taxol and Herceptin administered IV weekly x 12 weeks.  We reviewed the potential side effects of chemo- and HER2 targeted therapy including alopecia, fatigue, nausea, diarrhea, low blood counts, increased risk of infection, neuropathy, and cardiac toxicity.  We discussed typically chemotherapy would be administered prior to radiation therapy.  After completion of the initial 12 weeks of Taxol and Herceptin, plan would be to continue Herceptin alone once every 3 weeks to complete 1 year total of Herceptin.      She inquired about treatment with Herceptin without chemotherapy.  The benefits of this is less well studied, but we discussed this as an option as well.  In this case, Herceptin would be administered once every 3 weeks for a total of 1 year (17 total treatments).  Herceptin alone could be administered concurrently with radiation therapy.  Of note, she has met with Radiation Oncology at the  [facility] and has been offered 3-day partial breast irradiation.    Upon completion of radiation, the plan would be to start endocrine therapy.  Estradiol and FSH levels in 11/2020 indicated patient is kate-menopausal.  Therefore I would recommend initial treatment with tamoxifen.  Plan would be to treat with tamoxifen until 1 year without a menstrual bleed (or if she went a year from when the IUD was removed without a menstrual bleed).  We would recheck hormone levels at that time and if within postmenopausal range, would change from tamoxifen to letrozole. My recommendation will be for treatment with endocrine therapy for up to 10 years.  We reviewed potential side effects of endocrine therapy including joint stiffness, hot flashes, vaginal dryness, and mood disorder.    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  She still needs to consider treatment with Taxol.  She states she will think it over this week and let us know whether or not she would like to proceed with the chemotherapy portion of treatment.  We discussed consideration of a port-a-catheter for the IV therapies.  A port allows central venous access and can be placed in a day procedure by interventional radiology.  We discussed Herceptin +/- Taxol can be administered via a peripheral vein, however, given the number of anticipated infusions it is reasonable to consider a port.    At this time plan is to proceed with an echocardiogram.  She will consider Taxol and Herceptin vs Herceptin alone and notify us of her decision within one week.    Dr. Alcala had multiple questions which I answered to the best of my ability today.  I spent a total of 45 minutes in video visit with her today.  An additional 25 minutes was spent reviewing labs and pathology and in documentation.    Radha Alcala is a 50 year old female who is being evaluated via a billable video visit.      How would you like to obtain your  "AVS? MyChart  If the video visit is dropped, the invitation should be resent by: Send to e-mail at: [email or web address]  Will anyone else be joining your video visit? No    Vitals - Patient Reported  Weight (Patient Reported): 61.2 kg (135 lb)  Height (Patient Reported): 172.7 cm (5' 8\")  BMI (Based on Pt Reported Ht/Wt): 20.53  Pain Score: No Pain (0)    Jennyfer Gunn CMA January 5, 2021  12:34 PM         Video-Visit Details    Type of service:  Video Visit    Total time of video visit:  45 minutes.  An additional 25 minutes was spent reviewing labs and pathology an in documentation.    Originating Location (pt. Location): Home    Distant Location (provider location):  [facility]     Platform used for Video Visit: [employer]      Again, thank you for allowing me to participate in the care of your patient.        Sincerely,        Esperanza Nino MD    "

## 2021-01-05 NOTE — PROGRESS NOTES
"Gayla Alcala is a 50 year old female who is being evaluated via a billable video visit.      How would you like to obtain your AVS? MyChart  If the video visit is dropped, the invitation should be resent by: Send to e-mail at: [email or web address]  Will anyone else be joining your video visit? No  {If patient encounters technical issues they should call [phone] :[id number]}    Vitals - Patient Reported  Weight (Patient Reported): 61.2 kg (135 lb)  Height (Patient Reported): 172.7 cm (5' 8\")  BMI (Based on Pt Reported Ht/Wt): 20.53  Pain Score: No Pain (0)    Jennyfer Gunn CMA January 5, 2021  12:33 PM     Video Start Time: {video visit start/end time for provider to select:476382}  {PROVIDER CHARTING PREFERENCE:421459}    Subjective     Gayla Alcala is a 50 year old who presents to clinic today for the following health issues {ACCOMPANIED BY STATEMENT (Optional):275285}    HPI       ***    Review of Systems   {ROS COMP (Optional):927941}      Objective    Vitals - Patient Reported  Weight (Patient Reported): 61.2 kg (135 lb)  Height (Patient Reported): 172.7 cm (5' 8\")  BMI (Based on Pt Reported Ht/Wt): 20.53  Pain Score: No Pain (0)        Physical Exam   {video visit exam brief selected:292413::\"GENERAL: Healthy, alert and no distress\",\"EYES: Eyes grossly normal to inspection.  No discharge or erythema, or obvious scleral/conjunctival abnormalities.\",\"RESP: No audible wheeze, cough, or visible cyanosis.  No visible retractions or increased work of breathing.  \",\"SKIN: Visible skin clear. No significant rash, abnormal pigmentation or lesions.\",\"NEURO: Cranial nerves grossly intact.  Mentation and speech appropriate for age.\",\"PSYCH: Mentation appears normal, affect normal/bright, judgement and insight intact, normal speech and appearance well-groomed.\"}    {Diagnostic Test Results (Optional):257756}    {AMBULATORY ATTESTATION (Optional):359157}        Video-Visit Details    Type of service:  Video " "Visit    Video End Time:{video visit start/end time for provider to select:053128}    Originating Location (pt. Location): {video visit patient location:220388::\"Home\"}    Distant Location (provider location):  [facility]CER [facility]     Platform used for Video Visit: {Virtual Visit Platforms:746646::\"[employer]\"}  "

## 2021-01-12 ENCOUNTER — PATIENT OUTREACH (OUTPATIENT)
Dept: ONCOLOGY | Facility: CLINIC | Age: 51
End: 2021-01-12

## 2021-01-12 NOTE — PROGRESS NOTES
RN CARE COORDINATION NOTE      Outgoing: Called Radha to discuss her questions sent via [employer].   Discussed scheduling the Port and first infusion in order to have it ready if she does seek care with [facility].   She is still looking in to her options with cold capping through [facility], she will know more in the next 1-2 days.   She will update writer of her choice and if we should move forward with scheduling.       Verna Pro MSN, RN, OCN  RN Care Coordinator  [facility]  [phone]

## 2021-01-20 ENCOUNTER — TRANSFERRED RECORDS (OUTPATIENT)
Dept: HEALTH INFORMATION MANAGEMENT | Facility: CLINIC | Age: 51
End: 2021-01-20

## 2021-01-22 ENCOUNTER — PATIENT OUTREACH (OUTPATIENT)
Dept: ONCOLOGY | Facility: CLINIC | Age: 51
End: 2021-01-22

## 2021-01-22 NOTE — PROGRESS NOTES
Patient has elected to have treatment with another organization in order to pursue Cold Capping.

## 2021-09-11 ENCOUNTER — HEALTH MAINTENANCE LETTER (OUTPATIENT)
Age: 51
End: 2021-09-11

## 2022-01-01 ENCOUNTER — HEALTH MAINTENANCE LETTER (OUTPATIENT)
Age: 52
End: 2022-01-01

## 2022-02-26 ENCOUNTER — HEALTH MAINTENANCE LETTER (OUTPATIENT)
Age: 52
End: 2022-02-26

## 2022-05-24 ENCOUNTER — VIRTUAL VISIT (OUTPATIENT)
Dept: FAMILY MEDICINE | Facility: CLINIC | Age: 52
End: 2022-05-24
Payer: COMMERCIAL

## 2022-05-24 DIAGNOSIS — Z17.0 MALIGNANT NEOPLASM OF UPPER-OUTER QUADRANT OF RIGHT BREAST IN FEMALE, ESTROGEN RECEPTOR POSITIVE (H): ICD-10-CM

## 2022-05-24 DIAGNOSIS — Z20.822 SUSPECTED COVID-19 VIRUS INFECTION: Primary | ICD-10-CM

## 2022-05-24 DIAGNOSIS — C50.411 MALIGNANT NEOPLASM OF UPPER-OUTER QUADRANT OF RIGHT BREAST IN FEMALE, ESTROGEN RECEPTOR POSITIVE (H): ICD-10-CM

## 2022-05-24 PROCEDURE — 99203 OFFICE O/P NEW LOW 30 MIN: CPT | Mod: 95 | Performed by: FAMILY MEDICINE

## 2022-05-24 RX ORDER — SUMATRIPTAN 50 MG/1
50 TABLET, FILM COATED ORAL
COMMUNITY
Start: 2021-07-28

## 2022-05-24 NOTE — PROGRESS NOTES
"         No flowsheet data found.    Estimated body mass index is 20.68 kg/m  as calculated from the following:    Height as of 11/9/20: 1.727 m (5' 8\").    Weight as of 11/9/20: 61.7 kg (136 lb).     No results found for: GFRESTIMATED     FDA Facts Sheet  Lexicomp Drug Interaction review    Medications were reviewed with the patient and held or adjusted where applicable.    Current Outpatient Medications   Medication     multivitamin w/minerals (THERA-VIT-M) tablet     nirmatrelvir and ritonavir (PAXLOVID) therapy pack     SUMAtriptan (IMITREX) 50 MG tablet     No current facility-administered medications for this visit.                               Radha is a 51 year old who is being evaluated via a billable video visit.      How would you like to obtain your AVS? MyChart  If the video visit is dropped, the invitation should be resent by: Text to cell phone: [phone]  Will anyone else be joining your video visit? No    Video Start Time: 12:22 PM    Assessment & Plan     Suspected COVID-19 virus infection    Clinical diagnosis with fever and direct case exposure    - nirmatrelvir and ritonavir (PAXLOVID) therapy pack; Take 3 tablets by mouth 2 times daily for 5 days Take 2 Nirmatrelvir tablets and 1 Ritonavir tablet twice daily for 5 days.        Malignant neoplasm of upper-outer quadrant of right breast in female, estrogen receptor positive (H)  Risk condition      No follow-ups on file.    Jordan Romero MD  [facility]      Subjective   Radha is a 51 year old who presents for the following health issues     HPI       COVID-19 Symptom Review  How many days ago did these symptoms start? 3 1/2 days  5/21/22    Son is positive for COVID19     Are any of the following symptoms significant for you?    New or worsening difficulty breathing? No    Worsening cough? Yes, I am coughing up mucus, sometimes is dry, is actually improving a little today    Fever or chills? Yes, the " highest temperature was 102.5     Headache: YES    Sore throat: YES    Chest pain: no    Diarrhea: no    Body aches? no    What treatments has patient tried? IBU, cough drops   Does patient live in a nursing home, group home, or shelter? no  Does patient have a way to get food/medications during quarantined? Yes, I have a friend or family member who can help me.      Review of Systems   Constitutional, HEENT, cardiovascular, pulmonary, gi and gu systems are negative, except as otherwise noted.      Objective           Vitals:  No vitals were obtained today due to virtual visit.    Physical Exam   GENERAL: Healthy, alert and no distress  EYES: Eyes grossly normal to inspection.  No discharge or erythema, or obvious scleral/conjunctival abnormalities.  RESP: No audible wheeze, cough, or visible cyanosis.  No visible retractions or increased work of breathing.    SKIN: Visible skin clear. No significant rash, abnormal pigmentation or lesions.  NEURO: Cranial nerves grossly intact.  Mentation and speech appropriate for age.  PSYCH: Mentation appears normal, affect normal/bright, judgement and insight intact, normal speech and appearance well-groomed.            Video-Visit Details    Type of service:  Video Visit    Video End Time:12:35 PM    Originating Location (pt. Location): Home    Distant Location (provider location):  [facility]     Platform used for Video Visit: Flowboard

## 2022-05-24 NOTE — PATIENT INSTRUCTIONS

## 2022-10-29 ENCOUNTER — HEALTH MAINTENANCE LETTER (OUTPATIENT)
Age: 52
End: 2022-10-29

## 2023-04-08 ENCOUNTER — HEALTH MAINTENANCE LETTER (OUTPATIENT)
Age: 53
End: 2023-04-08

## 2023-12-01 ENCOUNTER — TRANSCRIBE ORDERS (OUTPATIENT)
Dept: OTHER | Age: 53
End: 2023-12-01

## 2023-12-01 DIAGNOSIS — Z00.00 ROUTINE HEALTH MAINTENANCE: Primary | ICD-10-CM

## 2023-12-05 ENCOUNTER — TELEPHONE (OUTPATIENT)
Dept: GASTROENTEROLOGY | Facility: CLINIC | Age: 53
End: 2023-12-05
Payer: COMMERCIAL

## 2023-12-05 NOTE — TELEPHONE ENCOUNTER
"Endoscopy Scheduling Screen    Have you had a positive Covid test in the last 14 days?  No    Are you active on MyChart?   No-regular mail     What insurance is in the chart?  Other:  [employer]    Ordering/Referring Provider: Ebony   (If ordering provider performs procedure, schedule with ordering provider unless otherwise instructed. )    BMI: Estimated body mass index is 20.68 kg/m  as calculated from the following:    Height as of 11/9/20: 1.727 m (5' 8\").    Weight as of 11/9/20: 61.7 kg (136 lb).     Sedation Ordered  moderate sedation.   If patient BMI > 50 do not schedule in [facility].    If patient BMI > 45 do not schedule at [facility].    Are you taking methadone or Suboxone?  No    Are you taking any prescription medications for pain 3 or more times per week?   No    Do you have a history of malignant hyperthermia or adverse reaction to anesthesia?  No    (Females) Are you currently pregnant?   No     Have you been diagnosed or told you have pulmonary hypertension?   No    Do you have an LVAD?  No    Have you been told you have moderate to severe sleep apnea?  No    Have you been told you have COPD, asthma, or any other lung disease?  No    Do you have any heart conditions?  No     Have you ever had an organ transplant?   No    Have you ever had or are you awaiting a heart or lung transplant?   No    Have you had a stroke or transient ischemic attack (TIA aka \"mini stroke\" in the last 6 months?   No    Have you been diagnosed with or been told you have cirrhosis of the liver?   No    Are you currently on dialysis?   No    Do you need assistance transferring?   No    BMI: Estimated body mass index is 20.68 kg/m  as calculated from the following:    Height as of 11/9/20: 1.727 m (5' 8\").    Weight as of 11/9/20: 61.7 kg (136 lb).     Is patients BMI > 40 and scheduling location [facility]?  No    Do you take an injectable medication for weight loss or diabetes (excluding insulin)?  No    Do you take the medication " Naltrexone?  No    Do you take blood thinners?  No       Prep   Are you currently on dialysis or do you have chronic kidney disease?  No    Do you have a diagnosis of diabetes?  No    Do you have a diagnosis of cystic fibrosis (CF)?  No    On a regular basis do you go 3 -5 days between bowel movements?  No    BMI > 40?  No    Preferred Pharmacy:    [id number] - [place], [place] - [address]  AT [facility] [address]  [address]   [place] [facility] [address]  Phone: [phone] Fax: [phone]      Final Scheduling Details   Colonoscopy prep sent?  Standard MiraLAX    Procedure scheduled  Colonoscopy    Surgeon:  Salma-patient requested     Date of procedure:  3/13/24     Pre-OP / PAC:   No - Not required for this site.    Location  [facility] - Patient preference.    Sedation   Moderate Sedation - Per order.      Patient Reminders:   You will receive a call from a Nurse to review instructions and health history.  This assessment must be completed prior to your procedure.  Failure to complete the Nurse assessment may result in the procedure being cancelled.      On the day of your procedure, please designate an adult(s) who can drive you home stay with you for the next 24 hours. The medicines used in the exam will make you sleepy. You will not be able to drive.      You cannot take public transportation, ride share services, or non-medical taxi service without a responsible caregiver.  Medical transport services are allowed with the requirement that a responsible caregiver will receive you at your destination.  We require that drivers and caregivers are confirmed prior to your procedure.

## 2024-02-09 ENCOUNTER — TELEPHONE (OUTPATIENT)
Dept: GASTROENTEROLOGY | Facility: CLINIC | Age: 54
End: 2024-02-09
Payer: COMMERCIAL

## 2024-02-09 NOTE — TELEPHONE ENCOUNTER
Caller: laura  Reason for Reschedule/Cancellation (please be detailed, any staff messages or encounters to note?): schedule conflict      Prior to reschedule please review:  Ordering Provider: Ebony   Sedation Determined: moderate  Does patient have any ASC Exclusions, please identify?: no      Notes on Cancelled Procedure:  Procedure: Lower Endoscopy [Colonoscopy]   Date: 3/13  Location: [facility]; [address] [place] [address]  Surgeon: ana      Rescheduled: Yes  Procedure: Lower Endoscopy [Colonoscopy]   Date: 6/12  Location: [facility]; [address], [address] [place] [address]  Surgeon: naina  Sedation Level Scheduled  moderate,  Reason for Sedation Level per order  Prep/Instructions updated and sent: codie

## 2024-06-02 ENCOUNTER — TELEPHONE (OUTPATIENT)
Dept: GASTROENTEROLOGY | Facility: CLINIC | Age: 54
End: 2024-06-02
Payer: COMMERCIAL

## 2024-06-02 NOTE — TELEPHONE ENCOUNTER
Pre visit planning completed.      Procedure details:    Patient scheduled for Colonoscopy  on 6.12.24.     Arrival time: 0700. Procedure time 0800    Facility location: [facility]; [address], [place] [address]. Check in location: Main entrance at registration desk.    Sedation type: Conscious sedation     Pre op exam needed? N/A    Indication for procedure: screening      Chart review:     Electronic implanted devices? No    Recent diagnosis of diverticulitis within the last 6 weeks? No    Diabetic? No      Medication review:    Anticoagulants? No    NSAIDS? No NSAID medications per patient's medication list.  RN will verify with pre-assessment call.    Other medication HOLDING recommendations:  N/A      Prep for procedure:     Bowel prep recommendation: Standard Miralax  Due to: standard bowel prep.    Prep instructions sent via letter         Natali Ivey RN  Endoscopy Procedure Pre Assessment RN  [phone] option 4

## 2024-06-04 NOTE — TELEPHONE ENCOUNTER
Pre assessment completed for upcoming procedure.      Procedure details:    Patient scheduled for Colonoscopy  on 6.12.24.     Arrival time: 0700. Procedure time 0800    Facility location: [facility]; [address], [place] [address]. Check in location: Main entrance at registration desk.    Sedation type: Conscious sedation     Pre op exam needed? N/A    Indication for procedure: screening    Designated  policy reviewed. Instructed to have someone stay 6 hours post procedure.       Chart review:     Electronic implanted devices? No    Recent diagnosis of diverticulitis within the last 6 weeks?  No    Diabetic? No      Medication review:    Anticoagulants? No    NSAIDS? No    Other medication HOLDING recommendations:  N/A      Prep for procedure:     Bowel prep recommendation: Standard Miralax  Due to: standard bowel prep.    Prep instructions sent via letter     Reviewed procedure prep instructions.     Patient verbalized understanding and had no questions or concerns at this time.        Natali Ivey RN  Endoscopy Procedure Pre Assessment   [phone] option 4

## 2024-06-08 ENCOUNTER — HEALTH MAINTENANCE LETTER (OUTPATIENT)
Age: 54
End: 2024-06-08

## 2024-06-11 NOTE — TELEPHONE ENCOUNTER
Patient called in to inquire if they could take Allegra for allergies in regards to their upcoming colonoscopy procedure. Advised patient there are no holding recommendations for Allegra. OK to take Allegra before colonoscopy.     Patient verbalized understanding and had no further questions.    Rosa Ceja RN  Endoscopy Procedure Pre-Assessment RN  [phone], option 4

## 2024-06-12 ENCOUNTER — HOSPITAL ENCOUNTER (OUTPATIENT)
Facility: CLINIC | Age: 54
Discharge: HOME | End: 2024-06-12
Attending: INTERNAL MEDICINE | Admitting: INTERNAL MEDICINE
Payer: COMMERCIAL

## 2024-06-12 VITALS
SYSTOLIC BLOOD PRESSURE: 97 MMHG | HEART RATE: 60 BPM | DIASTOLIC BLOOD PRESSURE: 64 MMHG | RESPIRATION RATE: 15 BRPM | OXYGEN SATURATION: 100 %

## 2024-06-12 LAB — COLONOSCOPY: NORMAL

## 2024-06-12 PROCEDURE — 99153 MOD SED SAME PHYS/QHP EA: CPT | Performed by: INTERNAL MEDICINE

## 2024-06-12 PROCEDURE — G0500 MOD SEDAT ENDO SERVICE >5YRS: HCPCS | Performed by: INTERNAL MEDICINE

## 2024-06-12 PROCEDURE — G0105 COLORECTAL SCRN; HI RISK IND: HCPCS | Performed by: INTERNAL MEDICINE

## 2024-06-12 PROCEDURE — 250N000011 HC RX IP 250 OP 636: Mod: JZ | Performed by: INTERNAL MEDICINE

## 2024-06-12 PROCEDURE — 45378 DIAGNOSTIC COLONOSCOPY: CPT | Performed by: INTERNAL MEDICINE

## 2024-06-12 RX ORDER — LIDOCAINE 40 MG/G
CREAM TOPICAL
Status: DISCONTINUED | OUTPATIENT
Start: 2024-06-12 | End: 2024-06-12 | Stop reason: HOSPADM

## 2024-06-12 RX ORDER — ONDANSETRON 2 MG/ML
4 INJECTION INTRAMUSCULAR; INTRAVENOUS EVERY 6 HOURS PRN
Status: DISCONTINUED | OUTPATIENT
Start: 2024-06-12 | End: 2024-06-12 | Stop reason: HOSPADM

## 2024-06-12 RX ORDER — NALOXONE HYDROCHLORIDE 0.4 MG/ML
0.2 INJECTION, SOLUTION INTRAMUSCULAR; INTRAVENOUS; SUBCUTANEOUS
Status: DISCONTINUED | OUTPATIENT
Start: 2024-06-12 | End: 2024-06-12 | Stop reason: HOSPADM

## 2024-06-12 RX ORDER — FENTANYL CITRATE 50 UG/ML
INJECTION, SOLUTION INTRAMUSCULAR; INTRAVENOUS PRN
Status: DISCONTINUED | OUTPATIENT
Start: 2024-06-12 | End: 2024-06-12 | Stop reason: HOSPADM

## 2024-06-12 RX ORDER — ONDANSETRON 4 MG/1
4 TABLET, ORALLY DISINTEGRATING ORAL EVERY 6 HOURS PRN
Status: DISCONTINUED | OUTPATIENT
Start: 2024-06-12 | End: 2024-06-12 | Stop reason: HOSPADM

## 2024-06-12 RX ORDER — NALOXONE HYDROCHLORIDE 0.4 MG/ML
0.4 INJECTION, SOLUTION INTRAMUSCULAR; INTRAVENOUS; SUBCUTANEOUS
Status: DISCONTINUED | OUTPATIENT
Start: 2024-06-12 | End: 2024-06-12 | Stop reason: HOSPADM

## 2024-06-12 RX ORDER — PROCHLORPERAZINE MALEATE 5 MG
10 TABLET ORAL EVERY 6 HOURS PRN
Status: DISCONTINUED | OUTPATIENT
Start: 2024-06-12 | End: 2024-06-12 | Stop reason: HOSPADM

## 2024-06-12 RX ORDER — FLUMAZENIL 0.1 MG/ML
0.2 INJECTION, SOLUTION INTRAVENOUS
Status: DISCONTINUED | OUTPATIENT
Start: 2024-06-12 | End: 2024-06-12 | Stop reason: HOSPADM

## 2024-06-12 RX ORDER — ONDANSETRON 2 MG/ML
4 INJECTION INTRAMUSCULAR; INTRAVENOUS
Status: DISCONTINUED | OUTPATIENT
Start: 2024-06-12 | End: 2024-06-12 | Stop reason: HOSPADM

## 2024-06-12 ASSESSMENT — ACTIVITIES OF DAILY LIVING (ADL)
ADLS_ACUITY_SCORE: 35

## 2024-06-12 NOTE — LETTER
May 15, 2024      Radha Meneses [address] [facility] [address]              Standard MiraLAX Bowel Prep  Prep Instructions for your Colonoscopy  Pre-Assessment Phone Number: [facility]; [phone] option 4    Please read these instructions carefully at least 7 days prior to your colonoscopy procedure. Be sure to follow all directions completely. The inside of your colon must be clean to allow for a complete examination for the presence of any growths, polyps, and/or abnormalities, as well as their biopsy or removal. A number of tips are included in order to make this part of the procedure as comfortable as possible.    Getting ready:   A nurse will call you to go over instructions and your health history.  It's important to complete the nurse assessment before your procedure. If you don't, your procedure might have to be canceled.  You must arrange for an adult to drive you home after your exam. Your colonoscopy cannot be done unless you have a ride. If you need to use public transportation, someone must ride with you and stay with you for a minimum of 24 hours.  Check with your insurance company to be sure they will cover this exam.  Go to the drug store and buy:  Four (4) - Dulcolax (Bisacodyl) 5mg tablets   8.3 ounce bottle of Miralax powder  64 ounces of Gatorade (not red or purple)     10 ounce bottle of clear magnesium citrate    7 days before the exam:  Talk to your prescribing provider: If you take blood thinners (such as Coumadin, Plavix, Xarelto, Eliquis, Lovenox, or others), these medications may need to be stopped temporarily before your procedure. Your prescribing provider will tell you what to do.   Talk to your prescribing provider: If you take prescription NSAIDS (such as Sulindac, Celebrex, Mobic, Relafen). Your prescribing provider will tell you what to do.   Stop taking fiber and iron supplements   Stop eating corn, popcorn, nuts and foods that contain seeds.  These can stay in the colon for many days and they can clog up the colonoscope.     3 days before the exam:  Begin a low-fiber diet (see below).   Drink at least 4 to 6 large glasses of water or sports drink (not red or purple) each day.     One day before the exam:  Only clear liquid diet is allowed (see below). No solid food should be eaten.   Drink at least 8 to 10 full glasses of clear liquids during the day.   Stop taking NSAID pain relievers, such as Advil, Ibuprofen, Motrin, etc.  You may take Tylenol.  Note: You will start drinking the colonoscopy prep solution at 5 PM. The timing of second step depends on your appointment arrival time. See Steps 1-2 below.    Step One:  At 4 PM, take 2 Dulcolax (Bisacodyl) tablets.   At 4 PM, mix the entire bottle of Miralax with 64 ounces of Gatorade in a pitcher and stir to dissolve the powder. Chill if desired, but do not add ice.   At 5 PM, start drinking an 8-ounce glass of the Miralax and Gatorade mixture every 15 minutes until the pitcher is HALF empty (about 4 glasses).  Store the rest in the refrigerator.   Bowel movements usually begin about one hour after your finish this first dose of Miralax. The stool is likely to be brown at this stage.   After you start drinking the solution, stay near a toilet. You may have watery stools (diarrhea), mild cramping, bloating , and nausea.   You may want to use Vaseline on the skin around your anus after each bowel movement to prevent irritation. You can also use wet wipes to prevent irritation.  Continue to drink clear liquids.     At 10 PM, take 2 Dulcolax (Bisacodyl) tablets  At 10 PM start drinking an 8-ounce glass of Miralax and Gatorade mixture every 15 minutes until the pitcher is empty (about 4 glasses).       Step Two:   If you arrive for your procedure before 11 AM:    6 hours prior to your scheduled arrival to the endoscopy unit drink 10 ounces of clear Magnesium Citrate    If you arrive for your procedure after 11  AM:     At 6 AM on the day of the exam: drink 10 ounces of clear Magnesium Citrate        Day of exam:  You may drink clear liquids only up until 2 hours before your arrival time.  You may take your necessary morning medications with sips of water  Do not take diabetes medicine by mouth until after your exam.  Do not smoke or swallow anything, including water or gum for at least 2 hours before your arrival time. This is a safety issue. Your procedure could be cancelled if you do not follow directions.  No chewing tobacco 6 hours prior to procedure arrival time.   Please do not wear jewelry (i.e. earrings, rings, necklaces, watches, etc) . Leave your purse, billfold, credit cards, and other valuables at home.    Please arrive with a responsible adult who can take you home after the test and stay with you for a minimum of 24 hours: The medicine used will make you sleepy and forgetful. If you do not have someone to take you home, we will cancel your procedure. If using public transportation you must have someone to ride with you.  Please perform your nebulizer treatments and airway clearance therapy in the morning prior to the procedure (if applicable).  If you have asthma, bring your inhalers.       CLEAR LIQUID DIET   You may have:    Water, tea, coffee (no milk or cream)  Soda pop, Gatorade (not red or purple)  Jell-O, Popsicles (no milk or fruit pieces - not red or purple)  Fat-free soup broth or bouillon  Plain hard candy, such as clear life savers (not red or purple)  Clear juices and fruit-flavored drinks, such as apple juice, white grape juice, Hi-C, and Sohail-Aid (not red or purple)   Do not have:    Milk or milk products such as ice cream, malts or shakes, or coffee creamer  Red or purple drinks of any kind such as cranberry juice or grape juice. Avoid red or purple Jell-O, Popsicles, Sohail-Aid, sorbet, sherbet and candy  Juices with pulp such as orange, grapefruit, pineapple or tomato juice  Cream soups of any  kind  Alcohol and beer  Protein drinks or protein powder     LOW FIBER DIET   You may have:    Starches: White bread, rolls, biscuits, croissants, Laura toast, white flour tortillas, waffles, pancakes, Albanian toast; white rice, noodles, pasta, macaroni; cooked and peeled potatoes; plain crackers, saltines; cooked farina or cream of rice; puffed rice, corn flakes, Rice Krispies, Special K   Vegetables: tender cooked and canned, vegetable broths  Fruits and fruit juices: Strained fruit juice, canned fruit without seeds or skin (not pineapple), applesauce, pear sauce, ripe bananas, melons (not watermelon)   Milk products: Milk (plain or flavored), cheese, cottage cheese, yogurt (no berries), custard, ice cream    Proteins: Tender, well-cooked ground beef, lamb, veal, ham, pork, chicken, turkey, fish or organ meats; eggs; creamy peanut butter   Fats and condiments:  Margarine, butter, oils, mayonnaise, sour cream, salad dressing, plain gravy; spices, cooked herbs; sugar, clear jelly, honey, syrup   Snacks, sweets and drinks: Pretzels, hard candy; plain cakes and cookies (no nuts or seeds); gelatin, plain pudding, sherbet, Popsicles; coffee, tea, carbonated ( fizzy ) drinks Do not have:    Starches: Breads or rolls that contain nuts, seeds or fruit; whole wheat or whole grain breads that contain more than 1 gram of fiber per slice; cornbread; corn or whole wheat tortillas; potatoes with skin; brown rice, wild rice, kasha (buckwheat), and oatmeal   Vegetables: Any raw or steamed vegetables; vegetables with seeds; corn in any form   Fruits and fruit juices: Prunes, prune juice, raisins and other dried fruits, berries and other fruits with seeds, canned pineapple juices with pulp such as orange, grapefruit, pineapple or tomato juice  Milk products: Any yogurt with nuts, seeds or berries   Proteins: Tough, fibrous meats with gristle; cooked dried beans, peas or lentils; crunchy peanut butter  Fats and condiments: Pickles,  olives, relish, horseradish; jam, marmalade, preserves   Snacks, sweets and drinks: Popcorn, nuts, seeds, granola, coconut, candies made with nuts or seeds; all desserts that contain nuts, seeds, raisins and other dried fruits, coconut, whole grains or bran.      FAQ:    Why should Miralax be mixed with Gatorade or another clear sports drink?   It is important that your body does not develop an imbalance of electrolytes with the large volume of fluid in this prep. Gatorade/sports drinks contains those electrolytes.   Does Miralax have to be mixed with Gatorade?  Gatorade, Powerade, or any of the other sports drinks are acceptable. If you are diabetic, it is acceptable to use the low sugar options, such as Gatorade Zero, Powerade Zero, G2, etc.  Can I put ice in the Gatorade/Miralax mixture?  We prefer that you mix it and put it in the refrigerator to chill instead of using ice. The ice will melt and dilute the laxative.    Why should the Miralax prep be taken in several steps?   The stool is flushed out by a large wave of fluid going through the colon. Just sipping a large volume of the solution will not achieve the desired result. Studies have shown that two smaller waves (or more in some cases) are better than one large one.    Why are the second Miralax dose and Magnesium Citrate so close to the exam?   The intestine continues to produce mucus and waste. Longer intervals between the prep and the exam can lead to less than desired results. However, the stomach must be empty at the time of the exam in order to allow safe sedation. Therefore, there should be nothing by mouth 2 hours before the exam is started.   How do you know if your colon is cleaned out?   After completing the bowel prep, your bowel movements should be all liquid and yellow. Your bowel movements will look similar to urine in the toilet. If there are pieces of stool (poop) in the toilet, or if you can't see to the bottom of the toilet, please call  our office for advice. Call [phone] and ask to speak with a nurse.   Why do you need a responsible  to take you home and stay with you?  We require a responsible adult to take you home for your safety. The sedation medicines used to relax you during the procedure can impair your judgement and reaction time, and make you forgetful and possibly a little unsteady. Do not drive, make any important decisions, or sign any legal documents for 24 hours after your procedure.   It is normal to feel bloated and gassy after your procedure. Walking will help move the air through your colon. You can take non-aspirin pain relievers that contain acetaminophen (Tylenol).     When can you eat after your procedure?  You may resume your normal diet when you feel ready, unless advised otherwise by the doctor performing your procedure. Do not drink alcohol for 24 hours after your procedure.   You many resume normal activities (work, exercise, etc.) after 24 hours.     When will you get test results?  You should have your procedure results and any lab results (if applicable) by letter, [employer] message, or phone call within 2 weeks. If you have any questions, please call the doctor that referred you for the procedure.         Updated: 06/22/2022

## 2024-06-12 NOTE — OR NURSING
Colonoscopy with no interventions performed under moderate sedation, patient tolerated well. Transferred to  and report given to recovery RN.

## 2024-06-12 NOTE — H&P
Gastroenterology Pre-op History and Physical    Radha Alcala MRN# [id number]   Age: 54 year old YOB: 1970      Date of Surgery: 06/12/24  [facility]      Date of Exam 6/12/2024 Facility Same Day       Primary care provider: No Ref-Primary, Physician         Chief Complaint and/or Reason for Procedure:   53 yo female for screening colonoscopy. Mother with colon cancer. No anticoagulation.  Prior exam normal 2019.         Past Medical and Surgical History:     No past medical history on file. Confirmed no relevant PMH.  History reviewed. No pertinent surgical history.         Medications (include herbals and vitamins):        Medications Prior to Admission   Medication Sig Dispense Refill Last Dose    multivitamin w/minerals (THERA-VIT-M) tablet Take 1 tablet by mouth daily   Unknown    SUMAtriptan (IMITREX) 50 MG tablet Take 50 mg by mouth   Unknown             Allergies:    No Known Allergies            Physical Exam:   All vitals have been reviewed  Patient Vitals for the past 8 hrs:   BP Pulse Resp SpO2   06/12/24 0755 109/77 71 (!) 7 100 %   06/12/24 0725 102/75 -- 16 100 %     No intake/output data recorded.  Airway assessment:   Patient is able to open mouth wide  Patient is able to stick out tongue  Mallampatti classification: Class II (visualization of the soft palate, fauces, and uvula)}      Lungs:   No increased work of breathing, good air exchange, clear to auscultation bilaterally, no crackles or wheezing     Cardiovascular:   regular rate and rhythm and normal S1 and S2                 Anesthetic risk and/or ASA classification:     ASA 1    Yariel Marsh MD

## 2024-06-28 ENCOUNTER — MEDICAL CORRESPONDENCE (OUTPATIENT)
Dept: HEALTH INFORMATION MANAGEMENT | Facility: CLINIC | Age: 54
End: 2024-06-28

## 2024-06-28 ENCOUNTER — TELEPHONE (OUTPATIENT)
Dept: FAMILY MEDICINE | Facility: CLINIC | Age: 54
End: 2024-06-28

## 2024-06-28 ENCOUNTER — OFFICE VISIT (OUTPATIENT)
Dept: FAMILY MEDICINE | Facility: CLINIC | Age: 54
End: 2024-06-28
Payer: COMMERCIAL

## 2024-06-28 VITALS
OXYGEN SATURATION: 99 % | WEIGHT: 141 LBS | BODY MASS INDEX: 21.37 KG/M2 | RESPIRATION RATE: 14 BRPM | HEIGHT: 68 IN | HEART RATE: 64 BPM | DIASTOLIC BLOOD PRESSURE: 74 MMHG | SYSTOLIC BLOOD PRESSURE: 113 MMHG | TEMPERATURE: 97.8 F

## 2024-06-28 DIAGNOSIS — M54.50 CHRONIC BILATERAL LOW BACK PAIN WITHOUT SCIATICA: Primary | ICD-10-CM

## 2024-06-28 DIAGNOSIS — G89.29 CHRONIC BILATERAL LOW BACK PAIN WITHOUT SCIATICA: Primary | ICD-10-CM

## 2024-06-28 PROBLEM — C50.411 MALIGNANT NEOPLASM OF UPPER-OUTER QUADRANT OF RIGHT FEMALE BREAST (H): Status: ACTIVE | Noted: 2020-11-19

## 2024-06-28 PROBLEM — M85.80 OSTEOPENIA: Status: ACTIVE | Noted: 2021-12-07

## 2024-06-28 PROBLEM — I82.629 DEEP VEIN THROMBOSIS (DVT) OF UPPER EXTREMITY (H): Status: ACTIVE | Noted: 2021-02-26

## 2024-06-28 PROBLEM — C50.919 INFILTRATING DUCTAL CARCINOMA OF BREAST (H): Status: ACTIVE | Noted: 2020-11-12

## 2024-06-28 PROCEDURE — 99213 OFFICE O/P EST LOW 20 MIN: CPT | Performed by: FAMILY MEDICINE

## 2024-06-28 RX ORDER — FLUTICASONE PROPIONATE 50 MCG
2 SPRAY, SUSPENSION (ML) NASAL DAILY
COMMUNITY
Start: 2023-10-09

## 2024-06-28 RX ORDER — LIDOCAINE 50 MG/G
1 PATCH TOPICAL EVERY 24 HOURS
Qty: 100 PATCH | Refills: 0 | Status: SHIPPED | OUTPATIENT
Start: 2024-06-28 | End: 2024-07-19

## 2024-06-28 ASSESSMENT — PAIN SCALES - GENERAL: PAINLEVEL: MILD PAIN (2)

## 2024-06-28 NOTE — ASSESSMENT & PLAN NOTE
Chronic bilateral low back pain previously diagnosed as SI joint pain.  She says in her 20s she had an injury doing the leg press, over the years her back pain has flared up with certain activities and with pregnancy.  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  She says she sees a chiropractor which really helps but only temporarily, acupuncture also helps but also only temporarily, and a TENS unit has helped her but also only temporarily.  About 10 years ago she did a few years of physical therapy which she thinks did improve her pain but that was a long time ago.    At this time she would like referral to the spine center and specifically is hoping to see neurosurgeon Thania Hurtado MD-I did tell her she can start with going to the spine center and see if that is appropriate from there.  She is agreeable.    Massage has been helpful to her in the past and so I did make a prescription for full body massage therapy which she can try and submit to her insurance company.  I did inform her that I am not sure if they will cover this.    Abby we talked about pain management with 1000 mg of Tylenol p.o. 4 times daily and for breakthrough pain 600 mg of ibuprofen p.o. 4 times daily with food.  Lidoderm patches are also prescribed for her to try.

## 2024-06-28 NOTE — TELEPHONE ENCOUNTER
Patient called in reporting she saw Dr. Sigala in clinic today and was prescribed lidocaine patches but her pharmacy hasn't received the prescription yet.  RN reviewed medication hx and noted prescription is still pending at this time and recommended patient check back with her pharmacy in another hour or so.  Patient asked if anything else OTC is similar to the lidocaine patches while waiting for her prescription to go through.  RN informed patient she can try OTC Salonpas Lidocaine 4% patches or roll-on version.  Patient appreciative of information and had no further questions/concerns at this time.      Chica Ross RN  [facility]

## 2024-06-28 NOTE — PROGRESS NOTES
Assessment & Plan   Problem List Items Addressed This Visit          Nervous and Auditory    Chronic bilateral low back pain without sciatica - Primary     Chronic bilateral low back pain previously diagnosed as SI joint pain.  She says in her 20s she had an injury doing the leg press, over the years her back pain has flared up with certain activities and with pregnancy.  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  She says she sees a chiropractor which really helps but only temporarily, acupuncture also helps but also only temporarily, and a TENS unit has helped her but also only temporarily.  About 10 years ago she did a few years of physical therapy which she thinks did improve her pain but that was a long time ago.    At this time she would like referral to the spine center and specifically is hoping to see neurosurgeon Thania Hurtado MD-I did tell her she can start with going to the spine center and see if that is appropriate from there.  She is agreeable.    Massage has been helpful to her in the past and so I did make a prescription for full body massage therapy which she can try and submit to her insurance company.  I did inform her that I am not sure if they will cover this.    Abby we talked about pain management with 1000 mg of Tylenol p.o. 4 times daily and for breakthrough pain 600 mg of ibuprofen p.o. 4 times daily with food.  Lidoderm patches are also prescribed for her to try.         Relevant Medications    lidocaine (LIDODERM) 5 % patch    Other Relevant Orders    Spine  Referral    Massage Therapy Referral        Elian Garcia is a 54 year old, presenting for the following health issues:  Musculoskeletal Problem (Lower back pain and SI joint pain x2-3 weeks, but been dealing it for about 30 years, would like a referral to Thania Hurtado, Spine Neuro Surgeon.) and  "Imm/Inj      6/28/2024     1:02 PM   Additional Questions   Roomed by Rashaun Head MA   Accompanied by Self         6/28/2024     1:02 PM   Patient Reported Additional Medications   Patient reports taking the following new medications None     History of Present Illness       Back Pain:  She presents for follow up of back pain. Patient's back pain is a recurring problem.  Location of back pain:  Right lower back, left lower back, right buttock, left buttock, right hip, left hip, right side of waist and left side of waist  Description of back pain: burning, dull ache and sharp  Back pain spreads: nowhere    Since patient first noticed back pain, pain is: always present, but gets better and worse  Does back pain interfere with her job:  Yes       She eats 4 or more servings of fruits and vegetables daily.She consumes 0 sweetened beverage(s) daily. She exercises with enough effort to increase her heart rate 4 days per week.   She is taking medications regularly.           Objective    /74 (BP Location: Left arm, Patient Position: Sitting, Cuff Size: Adult Regular)   Pulse 64   Temp 97.8  F (36.6  C) (Oral)   Resp 14   Ht 1.727 m (5' 8\")   Wt 64 kg (141 lb)   SpO2 99%   BMI 21.44 kg/m    Body mass index is 21.44 kg/m .  Physical Exam  Constitutional:       Appearance: Normal appearance.   HENT:      Head: Normocephalic and atraumatic.   Cardiovascular:      Rate and Rhythm: Normal rate and regular rhythm.   Pulmonary:      Effort: Pulmonary effort is normal.   Musculoskeletal:         General: Normal range of motion.      Cervical back: Normal, normal range of motion and neck supple.      Thoracic back: Normal.      Lumbar back: Tenderness (tender over bilateral SI Joints) present. No swelling, edema, deformity, signs of trauma, lacerations, spasms or bony tenderness. Normal range of motion. Negative right straight leg raise test and negative left straight leg raise test. Scoliosis (lumbar spine concave to " the right.) present.   Neurological:      General: No focal deficit present.      Mental Status: She is alert and oriented to person, place, and time.                    Signed Electronically by: Mariana Sigala MD

## 2024-07-01 ENCOUNTER — TELEPHONE (OUTPATIENT)
Dept: FAMILY MEDICINE | Facility: CLINIC | Age: 54
End: 2024-07-01

## 2024-07-02 NOTE — TELEPHONE ENCOUNTER
PRIOR AUTHORIZATION DENIED    Medication: LIDOCAINE 5 % EX PTCH  Insurance Company: [employer] [place] - Phone [phone] Fax [phone]  Denial Date: 6/29/2024  Denial Reason(s):     Appeal Information:     Patient Notified: No

## 2024-07-11 NOTE — PROGRESS NOTES
"ASSESSMENT: Radha Alcala is a 54 year old female with past medical history significant for history of DVT, osteopenia, breast cancer who presents today for new patient evaluation of chronic, episodic, left greater than right low back/buttock pain.  Patient experiences flareups of pain about twice per year which typically resolve with chiropractic treatment.  Most recent flare began over 1 month ago after dragon boat practice.  She reports 85% improvement in current flare.  Current pain appears to be most likely related to lower lumbar facet arthropathy and/or sacroiliac joint dysfunction.  She also has occasional bilateral lateral hip pain which I suspect is related to greater trochanteric bursitis.  She does not have any radicular pain down the legs.  She is neurologically intact.    PLAN:  A shared decision making model was used.  The patient's values and choices were respected.  The following represents what was discussed and decided upon by the physician assistant and the patient.      1.  DIAGNOSTIC TESTS: Patient is scheduled for an MRI lumbar spine tomorrow through [facility].    2.  PHYSICAL THERAPY: No physical therapy was ordered today.  - Patient did traditional low back physical therapy 10 years ago.  - Patient to pelvic floor physical therapy for urinary incontinence several years ago.  We could consider pelvic joint dysfunction physical therapy, pending MRI results.    3.  MEDICATIONS: No changes are made to the patient's medications.  - Patient can take Tylenol as needed.  - Patient can take ibuprofen as needed.  - Patient did not tolerate Lidoderm patches.  They made her feel \"wacky.\"    4.  INTERVENTIONS: No interventions were ordered today.  We could consider interventional pain management if pain flares back up.  We could consider sacroiliac joint injections versus lumbar medial branch block/facet joint injections.    5.  PATIENT EDUCATION: Patient is in agreement the above plan.  All " "questions were answered.  -Patient reports that she has not participated in dragon boat paddling for the past 1 month.  I recommended she take an additional 2 weeks off of paddling.    6.  FOLLOW-UP:   Patient is going to follow-up with me after her MRI to review the results and discuss treatment options.  If she has questions or concerns in the meantime, she do not hesitate to call.      SUBJECTIVE:  Radha Alcala  Is a 54 year old female who presents today in consultation at request of Dr. Sigala for new patient evaluation of low back pain/buttock pain.  Patient reports that she first began to have back problems 25 years ago after doing the leg press at the gym.  She states that after she had her first child and gained a large amount of weight her back pain worsened.  She saw chiropractor who told her that her ligaments were stretched and she had sacroiliac joint problems.  She has continued to have episodic pain ever since.  She states that she typically experiences a flare of pain about twice per year.  This could be after a long drive or bending.  Typically flares resolved within 1 to 2 weeks with conservative care including chiropractic treatment.  On June 7, 2024 she went to paddling camp for dragon boat.  She paddled on 1 side of the boat for 2 days at camp.  She states that this flared up her back but it recovered quickly.  Then on June 14, 2024 she paddled and did hours of yard work which aggravated her back even more.  Over the next couple of days she could barely walk because the pain was so severe.  She tried chiropractic treatment, acupuncture, massage, and ibuprofen.  All of these treatments helped somewhat.  She got a prescription for a lidocaine patch but this made her feel \"wacky.\"  Over the past 1 months she reports her pain has gradually improved.  She states her pain is 85% better.    Patient complains of bilateral low back/upper buttock pain.  Pain is slightly worse on the left than the " right.  She describes the pain as an aching pain.  She states with prolonged walking she also experiences pain on the bilateral lateral hips.  Patient reports that when her pain is flared up walking makes the pain worse, twisting, and rolling over in bed.  She denies any pain further down the legs.  Denies any numbness, tingling, weakness down the legs.  She has chronic urinary incontinence which is infrequent.  She denies loss of bowel control.  Denies recent fevers, chills, sweats.    Treatment to date:  - Physical therapy for low back pain 10 years ago  - Pelvic floor physical therapy several years ago  - Chiropractic treatment as needed helpful  - Acupuncture treatment as needed helpful  - Massage helpful  - Ibuprofen helpful  - Lidocaine patch did not tolerate    Current Outpatient Medications   Medication Sig Dispense Refill    fluticasone (FLONASE) 50 MCG/ACT nasal spray Spray 2 sprays into both nostrils daily      lidocaine (LIDODERM) 5 % patch Place 1 patch onto the skin every 24 hours To prevent lidocaine toxicity, patient should be patch free for 12 hrs daily. 100 patch 0    metroNIDAZOLE (METROCREAM) 0.75 % external cream Apply a thin layer to affected area on face, twice a day Indications: Rosacea      SUMAtriptan (IMITREX) 50 MG tablet Take 50 mg by mouth       No current facility-administered medications for this visit.       No Known Allergies        Patient Active Problem List   Diagnosis    Infiltrating ductal carcinoma of right breast (H)    Malignant neoplasm of upper-outer quadrant of right female breast (H)    Infiltrating ductal carcinoma of breast (H)    Deep vein thrombosis (DVT) of upper extremity (H)    Osteopenia    Chronic bilateral low back pain without sciatica       Past Surgical History:   Procedure Laterality Date    COLONOSCOPY N/A 6/12/2024    Procedure: Colonoscopy;  Surgeon: Yariel Marsh MD;  Location:  GI   Laparoscopically 89  - Breast cancer surgery 2020    Family  history: Positive for prostate cancer, father had diabetes, father had heart disease    Social history: Patient is .  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  She denies tobacco use.  Drinks 1-3 alcoholic beverages per week.  Denies recreational drug use.      ROS:    Specifically negative for bowel/bladder dysfunction, fevers,chills, appetite changes, unexplained weight loss.   Otherwise 13 systems reviewed are negative.  Please see the patient's intake questionnaire from today for details.      OBJECTIVE:  PHYSICAL EXAMINATION:    CONSTITUTIONAL:  Vital signs as above.  No acute distress.  The patient is well nourished and well groomed.  PSYCHIATRIC:  The patient is awake, alert, oriented to person, place, time and answering questions appropriately with clear speech.    HEENT: Normocephalic, atraumatic.  Sclera clear.  Neck is supple.  SKIN:  Skin over the face, bilateral lower extremities, and posterior torso is clean, dry, intact without rashes.    GAIT:  Gait is non-antalgic.  The patient is able to heel and toe walk without significant difficulty.    STANDING EXAMINATION: Mild tenderness to palpation left lower lumbar paraspinous muscles L5-S1 and left sacroiliac joint.  Patient appears to have a mild convex left scoliotic deformity.  Lumbar flexion and extension both within normal limits.  MUSCLE STRENGTH:  The patient has 5/5 strength for the bilateral hip flexors, knee flexors/extensors, ankle dorsiflexors/plantar flexors, great toe extensors.  NEUROLOGICAL: 1-2+ patellar, and achilles reflexes bilaterally.  Negative Babinski's bilaterally.  No ankle clonus bilaterally. Sensation to light touch is intact in the bilateral L4, L5, and S1 dermatomes.  VASCULAR:  2/4 dorsalis pedis pulses bilaterally.  Bilateral lower extremities are warm.  There is no pitting edema of the bilateral lower extremities.  ABDOMINAL:  Soft, non-distended, non-tender throughout all  quadrants.  No pulsatile mass palpated in the left lower quadrant.  LYMPH NODES:  No palpable or tender inguinal lymph nodes.  MUSCULOSKELETAL: Negative pelvic distraction test.  Negative thigh thrust bilaterally.  Negative Toño's test bilaterally.

## 2024-07-15 ENCOUNTER — OFFICE VISIT (OUTPATIENT)
Dept: PHYSICAL MEDICINE AND REHAB | Facility: CLINIC | Age: 54
End: 2024-07-15
Attending: FAMILY MEDICINE
Payer: COMMERCIAL

## 2024-07-15 VITALS
BODY MASS INDEX: 21.44 KG/M2 | SYSTOLIC BLOOD PRESSURE: 127 MMHG | HEART RATE: 84 BPM | HEIGHT: 68 IN | DIASTOLIC BLOOD PRESSURE: 71 MMHG

## 2024-07-15 DIAGNOSIS — M54.50 CHRONIC BILATERAL LOW BACK PAIN WITHOUT SCIATICA: ICD-10-CM

## 2024-07-15 DIAGNOSIS — M53.3 SACROILIAC JOINT PAIN: Primary | ICD-10-CM

## 2024-07-15 DIAGNOSIS — G89.29 CHRONIC BILATERAL LOW BACK PAIN WITHOUT SCIATICA: ICD-10-CM

## 2024-07-15 PROCEDURE — 99204 OFFICE O/P NEW MOD 45 MIN: CPT | Performed by: PHYSICIAN ASSISTANT

## 2024-07-15 ASSESSMENT — PAIN SCALES - GENERAL: PAINLEVEL: NO PAIN (1)

## 2024-07-15 NOTE — LETTER
"7/15/2024      Radha Alcala  [address] [facility] [address]      Dear Colleague,    Thank you for referring your patient, Radha Alcala, to the [facility]. Please see a copy of my visit note below.    ASSESSMENT: Radha Alcala is a 54 year old female with past medical history significant for history of DVT, osteopenia, breast cancer who presents today for new patient evaluation of chronic, episodic, left greater than right low back/buttock pain.  Patient experiences flareups of pain about twice per year which typically resolve with chiropractic treatment.  Most recent flare began over 1 month ago after dragon boat practice.  She reports 85% improvement in current flare.  Current pain appears to be most likely related to lower lumbar facet arthropathy and/or sacroiliac joint dysfunction.  She also has occasional bilateral lateral hip pain which I suspect is related to greater trochanteric bursitis.  She does not have any radicular pain down the legs.  She is neurologically intact.    PLAN:  A shared decision making model was used.  The patient's values and choices were respected.  The following represents what was discussed and decided upon by the physician assistant and the patient.      1.  DIAGNOSTIC TESTS: Patient is scheduled for an MRI lumbar spine tomorrow through [facility].    2.  PHYSICAL THERAPY: No physical therapy was ordered today.  - Patient did traditional low back physical therapy 10 years ago.  - Patient to pelvic floor physical therapy for urinary incontinence several years ago.  We could consider pelvic joint dysfunction physical therapy, pending MRI results.    3.  MEDICATIONS: No changes are made to the patient's medications.  - Patient can take Tylenol as needed.  - Patient can take ibuprofen as needed.  - Patient did not tolerate Lidoderm patches.  They made her feel \"wacky.\"    4.  INTERVENTIONS: No interventions were ordered today.  We " could consider interventional pain management if pain flares back up.  We could consider sacroiliac joint injections versus lumbar medial branch block/facet joint injections.    5.  PATIENT EDUCATION: Patient is in agreement the above plan.  All questions were answered.  -Patient reports that she has not participated in dragon boat paddling for the past 1 month.  I recommended she take an additional 2 weeks off of paddling.    6.  FOLLOW-UP:   Patient is going to follow-up with me after her MRI to review the results and discuss treatment options.  If she has questions or concerns in the meantime, she do not hesitate to call.      SUBJECTIVE:  Radha Alcala  Is a 54 year old female who presents today in consultation at request of Dr. Sigala for new patient evaluation of low back pain/buttock pain.  Patient reports that she first began to have back problems 25 years ago after doing the leg press at the gym.  She states that after she had her first child and gained a large amount of weight her back pain worsened.  She saw chiropractor who told her that her ligaments were stretched and she had sacroiliac joint problems.  She has continued to have episodic pain ever since.  She states that she typically experiences a flare of pain about twice per year.  This could be after a long drive or bending.  Typically flares resolved within 1 to 2 weeks with conservative care including chiropractic treatment.  On June 7, 2024 she went to paddling camp for dragon boat.  She paddled on 1 side of the boat for 2 days at camp.  She states that this flared up her back but it recovered quickly.  Then on June 14, 2024 she paddled and did hours of yard work which aggravated her back even more.  Over the next couple of days she could barely walk because the pain was so severe.  She tried chiropractic treatment, acupuncture, massage, and ibuprofen.  All of these treatments helped somewhat.  She got a prescription for a lidocaine patch  "but this made her feel \"wacky.\"  Over the past 1 months she reports her pain has gradually improved.  She states her pain is 85% better.    Patient complains of bilateral low back/upper buttock pain.  Pain is slightly worse on the left than the right.  She describes the pain as an aching pain.  She states with prolonged walking she also experiences pain on the bilateral lateral hips.  Patient reports that when her pain is flared up walking makes the pain worse, twisting, and rolling over in bed.  She denies any pain further down the legs.  Denies any numbness, tingling, weakness down the legs.  She has chronic urinary incontinence which is infrequent.  She denies loss of bowel control.  Denies recent fevers, chills, sweats.    Treatment to date:  - Physical therapy for low back pain 10 years ago  - Pelvic floor physical therapy several years ago  - Chiropractic treatment as needed helpful  - Acupuncture treatment as needed helpful  - Massage helpful  - Ibuprofen helpful  - Lidocaine patch did not tolerate    Current Outpatient Medications   Medication Sig Dispense Refill     fluticasone (FLONASE) 50 MCG/ACT nasal spray Spray 2 sprays into both nostrils daily       lidocaine (LIDODERM) 5 % patch Place 1 patch onto the skin every 24 hours To prevent lidocaine toxicity, patient should be patch free for 12 hrs daily. 100 patch 0     metroNIDAZOLE (METROCREAM) 0.75 % external cream Apply a thin layer to affected area on face, twice a day Indications: Rosacea       SUMAtriptan (IMITREX) 50 MG tablet Take 50 mg by mouth       No current facility-administered medications for this visit.       No Known Allergies        Patient Active Problem List   Diagnosis     Infiltrating ductal carcinoma of right breast (H)     Malignant neoplasm of upper-outer quadrant of right female breast (H)     Infiltrating ductal carcinoma of breast (H)     Deep vein thrombosis (DVT) of upper extremity (H)     Osteopenia     Chronic bilateral low " back pain without sciatica       Past Surgical History:   Procedure Laterality Date     COLONOSCOPY N/A 6/12/2024    Procedure: Colonoscopy;  Surgeon: Yariel Marsh MD;  Location:  GI   Laparoscopically 89  - Breast cancer surgery 2020    Family history: Positive for prostate cancer, father had diabetes, father had heart disease    Social history: Patient is .  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.  She denies tobacco use.  Drinks 1-3 alcoholic beverages per week.  Denies recreational drug use.      ROS:    Specifically negative for bowel/bladder dysfunction, fevers,chills, appetite changes, unexplained weight loss.   Otherwise 13 systems reviewed are negative.  Please see the patient's intake questionnaire from today for details.      OBJECTIVE:  PHYSICAL EXAMINATION:    CONSTITUTIONAL:  Vital signs as above.  No acute distress.  The patient is well nourished and well groomed.  PSYCHIATRIC:  The patient is awake, alert, oriented to person, place, time and answering questions appropriately with clear speech.    HEENT: Normocephalic, atraumatic.  Sclera clear.  Neck is supple.  SKIN:  Skin over the face, bilateral lower extremities, and posterior torso is clean, dry, intact without rashes.    GAIT:  Gait is non-antalgic.  The patient is able to heel and toe walk without significant difficulty.    STANDING EXAMINATION: Mild tenderness to palpation left lower lumbar paraspinous muscles L5-S1 and left sacroiliac joint.  Patient appears to have a mild convex left scoliotic deformity.  Lumbar flexion and extension both within normal limits.  MUSCLE STRENGTH:  The patient has 5/5 strength for the bilateral hip flexors, knee flexors/extensors, ankle dorsiflexors/plantar flexors, great toe extensors.  NEUROLOGICAL: 1-2+ patellar, and achilles reflexes bilaterally.  Negative Babinski's bilaterally.  No ankle clonus bilaterally. Sensation to light touch is intact in the  bilateral L4, L5, and S1 dermatomes.  VASCULAR:  2/4 dorsalis pedis pulses bilaterally.  Bilateral lower extremities are warm.  There is no pitting edema of the bilateral lower extremities.  ABDOMINAL:  Soft, non-distended, non-tender throughout all quadrants.  No pulsatile mass palpated in the left lower quadrant.  LYMPH NODES:  No palpable or tender inguinal lymph nodes.  MUSCULOSKELETAL: Negative pelvic distraction test.  Negative thigh thrust bilaterally.  Negative Toño's test bilaterally.        Again, thank you for allowing me to participate in the care of your patient.        Sincerely,        Maira Samano PA-C

## 2024-07-15 NOTE — PROGRESS NOTES
"Assessment:   Radha Alcala is a 54 year old y.o. female with past medical history significant for  history of DVT, osteopenia, breast cancer  who presents today for follow-up regarding chronic, episodic, left greater than right low back/buttock pain.  My review of an MRI lumbar spine shows a right eccentric disc bulge at L4/5 with mild spinal canal and lateral recess stenosis. There is also lower lumbar facet arthropathy.  Pain is likely related to both lumbar spondylosis and sacroiliac joint dysfunction.       Plan:     A shared decision making plan was used.  The patient's values and choices were respected.  The following represents what was discussed and decided upon by the physician assistant and the patient.      1.  DIAGNOSTIC TESTS:   I reviewed the MRI lumbar spine.No additional diagnostic tests were ordered.    2.  PHYSICAL THERAPY:   -I entered a referral or [facility] PT.  -[facility] physical therapy is also an option if pain persists.  - Patient did traditional low back physical therapy 10 years ago.    3.  MEDICATIONS:  No changes are made to the patient's medications.  - Patient can take Tylenol as needed.  - Patient can take ibuprofen as needed.  - Patient did not tolerate Lidoderm patches.  They made her feel \"wacky.\"    4.  INTERVENTIONS:  No interventions were ordered.    5.  PATIENT EDUCATION:  Patient in agreement with plan.    6.  FOLLOW-UP: Follow up in two months.    Subjective:     Radha Alcala is a 54 year old female who presents today for follow-up regarding low back/buttock pain.  I saw the patient in consultation July 15, 2024.  She returns today to review her MRI lumbar spine results.  She denies any significant change in her pain.    Patient complains of episodic left greater than right lower back/buttock pain.  No pain down the legs.  No numbness/tingling/weakness. Pain is 1/10 today, 9/10 at its worst, 0/10 at its best.  Pain is aggravated with dragon boat rowing and alleviated with " rest.    Treatment to date:  - Physical therapy for low back pain 10 years ago  - Pelvic floor physical therapy several years ago  - Chiropractic treatment as needed helpful  - Acupuncture treatment as needed helpful  - Massage helpful  - Ibuprofen helpful  - Lidocaine patch did not tolerate    Review of Systems:  Negative for numbness/tingling, weakness, loss of bowel/bladder control, inability to urinate, headache, pain worse at night, falls, difficulty swallowing, difficulty with hand skills, fevers, unintentional weight loss.     Objective:   CONSTITUTIONAL:  Vital signs as above.  No acute distress.  The patient is well nourished and well groomed.    PSYCHIATRIC:  The patient is awake, alert, oriented to person, place and time.  The patient is answering questions appropriately with clear speech.  Normal affect.  HEENT: Normocephalic, atraumatic.  Sclera clear.    SKIN:  Exposed skin is clean, dry, intact without rashes.  MUSCULOSKELETAL:  moving all extremities equally.  NEUROLOGICAL:  CN II-XII grossly intact.     RESULTS:  I reviewed the MRI lumbar spine from [facility] from 7/17/24. This shows a left simeon curvature lower thoracic and upper lumbar spine.  At L4/5 there is a disc bulge eccentric to the right with mild spinal canal stenosis and mild right lateral recess stenosis.  There is also mild bilateral foraminal stenosis.  At L5/S1 there is minimal right and mild left foraminal stenosis.  There is mil facet arthropathy in the lower lumbar spine.

## 2024-07-16 ENCOUNTER — ANCILLARY PROCEDURE (OUTPATIENT)
Dept: RADIOLOGY | Facility: CLINIC | Age: 54
End: 2024-07-16
Payer: COMMERCIAL

## 2024-07-19 ENCOUNTER — OFFICE VISIT (OUTPATIENT)
Dept: PHYSICAL MEDICINE AND REHAB | Facility: CLINIC | Age: 54
End: 2024-07-19
Payer: COMMERCIAL

## 2024-07-19 VITALS
BODY MASS INDEX: 21.37 KG/M2 | DIASTOLIC BLOOD PRESSURE: 73 MMHG | SYSTOLIC BLOOD PRESSURE: 113 MMHG | HEART RATE: 78 BPM | HEIGHT: 68 IN | WEIGHT: 141 LBS

## 2024-07-19 DIAGNOSIS — M53.3 SACROILIAC JOINT PAIN: ICD-10-CM

## 2024-07-19 DIAGNOSIS — M47.816 SPONDYLOSIS OF LUMBAR REGION WITHOUT MYELOPATHY OR RADICULOPATHY: Primary | ICD-10-CM

## 2024-07-19 PROCEDURE — 99213 OFFICE O/P EST LOW 20 MIN: CPT | Performed by: PHYSICIAN ASSISTANT

## 2024-07-19 ASSESSMENT — PAIN SCALES - GENERAL: PAINLEVEL: NO PAIN (1)

## 2024-07-19 NOTE — LETTER
"7/19/2024      Radha Alcala  [address] [facility] [id number]      Dear Colleague,    Thank you for referring your patient, Radha Alcala, to the [facility]. Please see a copy of my visit note below.    Assessment:   Radha Alcala is a 54 year old y.o. female with past medical history significant for  history of DVT, osteopenia, breast cancer  who presents today for follow-up regarding chronic, episodic, left greater than right low back/buttock pain.  My review of an MRI lumbar spine shows a right eccentric disc bulge at L4/5 with mild spinal canal and lateral recess stenosis. There is also lower lumbar facet arthropathy.  Pain is likely related to both lumbar spondylosis and sacroiliac joint dysfunction.       Plan:     A shared decision making plan was used.  The patient's values and choices were respected.  The following represents what was discussed and decided upon by the physician assistant and the patient.      1.  DIAGNOSTIC TESTS:   I reviewed the MRI lumbar spine.No additional diagnostic tests were ordered.    2.  PHYSICAL THERAPY:   -I entered a referral or [facility] PT.  -[facility] physical therapy is also an option if pain persists.  - Patient did traditional low back physical therapy 10 years ago.    3.  MEDICATIONS:  No changes are made to the patient's medications.  - Patient can take Tylenol as needed.  - Patient can take ibuprofen as needed.  - Patient did not tolerate Lidoderm patches.  They made her feel \"wacky.\"    4.  INTERVENTIONS:  No interventions were ordered.    5.  PATIENT EDUCATION:  Patient in agreement with plan.    6.  FOLLOW-UP: Follow up in two months.    Subjective:     Radha Alcala is a 54 year old female who presents today for follow-up regarding low back/buttock pain.  I saw the patient in consultation July 15, 2024.  She returns today to review her MRI lumbar spine results.  She denies any significant change in her pain.    Patient " complains of episodic left greater than right lower back/buttock pain.  No pain down the legs.  No numbness/tingling/weakness. Pain is 1/10 today, 9/10 at its worst, 0/10 at its best.  Pain is aggravated with dragon boat rowing and alleviated with rest.    Treatment to date:  - Physical therapy for low back pain 10 years ago  - Pelvic floor physical therapy several years ago  - Chiropractic treatment as needed helpful  - Acupuncture treatment as needed helpful  - Massage helpful  - Ibuprofen helpful  - Lidocaine patch did not tolerate    Review of Systems:  Negative for numbness/tingling, weakness, loss of bowel/bladder control, inability to urinate, headache, pain worse at night, falls, difficulty swallowing, difficulty with hand skills, fevers, unintentional weight loss.     Objective:   CONSTITUTIONAL:  Vital signs as above.  No acute distress.  The patient is well nourished and well groomed.    PSYCHIATRIC:  The patient is awake, alert, oriented to person, place and time.  The patient is answering questions appropriately with clear speech.  Normal affect.  HEENT: Normocephalic, atraumatic.  Sclera clear.    SKIN:  Exposed skin is clean, dry, intact without rashes.  MUSCULOSKELETAL:  moving all extremities equally.  NEUROLOGICAL:  CN II-XII grossly intact.     RESULTS:  I reviewed the MRI lumbar spine from [facility] from 7/17/24. This shows a left simeon curvature lower thoracic and upper lumbar spine.  At L4/5 there is a disc bulge eccentric to the right with mild spinal canal stenosis and mild right lateral recess stenosis.  There is also mild bilateral foraminal stenosis.  At L5/S1 there is minimal right and mild left foraminal stenosis.  There is mil facet arthropathy in the lower lumbar spine.      Again, thank you for allowing me to participate in the care of your patient.        Sincerely,        Maira Samano PA-C

## 2024-07-19 NOTE — PATIENT INSTRUCTIONS
An order for physicaltherapy has been provided today.  Someone will call you to schedule physical therapy or you can call [phone] to schedule physical therapy.  It will be very important for you to do your physical therapy exercises on aregular basis to decrease your pain and prevent future flares of pain.

## 2024-07-30 ENCOUNTER — THERAPY VISIT (OUTPATIENT)
Dept: PHYSICAL THERAPY | Facility: CLINIC | Age: 54
End: 2024-07-30
Attending: PHYSICIAN ASSISTANT
Payer: COMMERCIAL

## 2024-07-30 DIAGNOSIS — M47.816 SPONDYLOSIS OF LUMBAR REGION WITHOUT MYELOPATHY OR RADICULOPATHY: ICD-10-CM

## 2024-07-30 PROCEDURE — 97161 PT EVAL LOW COMPLEX 20 MIN: CPT | Mod: GP

## 2024-07-30 PROCEDURE — 97110 THERAPEUTIC EXERCISES: CPT | Mod: GP

## 2024-07-30 NOTE — PROGRESS NOTES
PHYSICAL THERAPY EVALUATION  Type of Visit: Evaluation       Fall Risk Screen:  Fall screen completed by: PT  Have you fallen 2 or more times in the past year?: No  Have you fallen and had an injury in the past year?: No  Is patient a fall risk?: No    Subjective       Presenting condition or subjective complaint: 2 lower disc issus and previois si surekha stone    Patient is a 54 y.o female with past medical history significant for history of DVT, osteopenia, breast cancer, who presents with chronic, episodic, left greater than right low back pain. About a decade ago did PT similar to [facility] - a bout of 18 months. Had acupuncture yesterday, just a little sore. Does competitive dragon paddling; wants to get back to that. Is currently doing Peloton, stretching, and light dumbbell exercises to stay active.       Rotary session - 30lbs   Date of onset: 07/19/24    Relevant medical history:     Dates & types of surgery: 1992 laproscopy 2020 lumpectomy 2021 port 2022 pory rrmoval    Prior diagnostic imaging/testing results: MRI     Prior therapy history for the same diagnosis, illness or injury: No      Prior Level of Function  Transfers: Independent  Ambulation: Independent  ADL: Independent  IADL:     Living Environment  Social support: With a significant other or spouse   Type of home: House; 2-story; Basement   Stairs to enter the home: Yes       Ramp: No   Stairs inside the home: Yes 40 Is there a railing: Yes     Help at home: None  Equipment owned:       Employment: Yes professor  Hobbies/Interests: walking paddling biking yoga weights    Patient goals for therapy: hinge and rotate withiut pain and drsgon boat paddle    Pain assessment:  Pain currently at a 0-1.5/10; can get up to 9/10     Objective   LUMBAR SPINE EVALUATION  PAIN: 0-1.5/10, 9/10 at it's worst. Currently goes to the chiropractor,acupuncture for pain management. Wants to initiate massage. No radiating pain.    INTEGUMENTARY (edema, incisions):  WNL  POSTURE: WNL  GAIT:   Weightbearing Status: WBAT  Assistive Device(s): None  Gait Deviations: WNL  BALANCE/PROPRIOCEPTION:  can assess s/l balance time at follow-up  WEIGHTBEARING ALIGNMENT: WNL  NON-WEIGHTBEARING ALIGNMENT: WNL   ROM: AROM WNL  PELVIC/SI SCREEN:   STRENGTH:     MYOTOMES:    Left Right   T12-L3 (Hip Flexion) 4+ 4+   L2-4 (Quads)  4+ 4+   L4 (Ankle DF) 5 5   L5 (Great Toe Ext)     S1 (Toe Raise)       DTR S:   CORD SIGNS:   DERMATOMES:   NEURAL TENSION:   FLEXIBILITY: WNL  LUMBAR/HIP Special Tests:    Left Right   PRANAV Negative  Negative    FADIR/Labrum/MICHAEL Negative  Negative    Femoral Nerve     Diogo's Negative  Negative    Piriformis Negative  Negative    Quadrant Testing     SLR     Slump     Stork with Extension     Haile             PELVIS/SI SPECIAL TESTS:   FUNCTIONAL TESTS: Double Leg Squat: Knee valgus, Improper use of glutes/hips, Able to perform full deep squat without pain, and With heels elevated    PALPATION:   + Tenderness At Location Left Right   Quadratus Lumborum - -   Erector Spinae - -   Piriformis      PSIS + -   ASIS     Iliac Crest     Glut Medius     Greater Trochanter     Ischial Tuberosity     Hamstrings     Hip Flexors     Vertebral        SPINAL SEGMENTAL CONCLUSIONS:       Assessment & Plan   CLINICAL IMPRESSIONS  Medical Diagnosis: M47.816 (ICD-10-CM) - Spondylosis of lumbar region without myelopathy or radiculopathy    Treatment Diagnosis: decreased activity tolerance, poor movement pattern   Impression/Assessment: Patient is a 54 year old female with low back L>R complaints.  The following significant findings have been identified: Decreased strength, Impaired muscle performance, and Decreased activity tolerance. These impairments interfere with their ability to perform self care tasks, work tasks, recreational activities, household chores, driving , household mobility, and community mobility as compared to previous level of function.     Clinical Decision  Making (Complexity):  Clinical Presentation: Stable/Uncomplicated  Clinical Presentation Rationale: based on medical and personal factors listed in PT evaluation  Clinical Decision Making (Complexity): Low complexity    PLAN OF CARE  Treatment Interventions:  Modalities: Cryotherapy, Hot Pack  Interventions: Gait Training, Manual Therapy, Neuromuscular Re-education, Therapeutic Activity, Therapeutic Exercise, Self-Care/Home Management    Long Term Goals     PT Goal 1  Goal Identifier: HEP  Goal Description: Patient will be independent with Self-management of symptoms to increase functional activity at home and within the community  Target Date: 10/22/24  PT Goal 2  Goal Identifier: Bending/lifting  Goal Description: Pt will be able to bend and  object from floor with proper body mechanics for ADLs.  Target Date: 10/22/24  PT Goal 3  Goal Identifier: Standing  Goal Description: Pt will improve max lumbar extensor strength as measured on the Med[facility] machine by 25% in order to improve tolerance of upright posture while standing and walking around work  Target Date: 10/22/24      Frequency of Treatment: 2x/week for 4 weeks, tapering off to 1x/week over 12 weeks  Duration of Treatment: 12-16    Recommended Referrals to Other Professionals:   Education Assessment:   Learner/Method: Patient  Education Comments: Education provided regarding response to exercise, differentiating between pain and soreness, as well as the importance of compliance with HEP.    Risks and benefits of evaluation/treatment have been explained.   Patient/Family/caregiver agrees with Plan of Care.     Evaluation Time:     PT Eval, Low Complexity Minutes (01994): 25       Signing Clinician: Lalit Palm, PT        All weight progressions in therapy sessions are dictated by the patient tolerance and therapist discretion. Testing on Med[facility] equipment is completed on 4-week intervals to allow for physiological change in muscle structure and  neuromuscular re-education.    Lumbar [facility] Initial testing 7/30/24   AROM (full=  0-72  lumbar) 0-72    Max Extension Torque  230   Flex: ext ratio (ideal 1.4:1) 8.44:1       Date    Lumbar Parameters    Top Dead Center (TDC) 24   Counterbalance (CB) 329   Seat Pad 0   Femur Restraint 3   Week/Visit    Enter Week/Visit #    Weight (lbs)    Reps (#)    Time    ROM (degrees)    Pain    Therapist cues      Date 7/30/24   Exercise    Rotary Torso 30 lbs R   Seated side bends  5 x 5 sec each side   Standing extension  3 x 5 sec                                         Modifications instructed by therapist:

## 2024-08-06 ENCOUNTER — THERAPY VISIT (OUTPATIENT)
Dept: PHYSICAL THERAPY | Facility: CLINIC | Age: 54
End: 2024-08-06
Payer: COMMERCIAL

## 2024-08-06 DIAGNOSIS — M47.816 SPONDYLOSIS OF LUMBAR REGION WITHOUT MYELOPATHY OR RADICULOPATHY: Primary | ICD-10-CM

## 2024-08-06 PROCEDURE — 97110 THERAPEUTIC EXERCISES: CPT | Mod: GP

## 2024-08-06 NOTE — PROGRESS NOTES
All weight progressions in therapy sessions are dictated by the patient tolerance and therapist discretion. Testing on MedX equipment is completed on 4-week intervals to allow for physiological change in muscle structure and neuromuscular re-education.     Lumbar [facility] Initial testing 7/30/24   AROM (full=  0-72  lumbar) 0-72    Max Extension Torque  230   Flex: ext ratio (ideal 1.4:1) 8.44:1         Date 8/6/2024 7/30/24   Lumbar Parameters      Top Dead Center (TDC) 24 24   Counterbalance (CB) 329 329   Seat Pad 0 0   Femur Restraint 3 3   Week/Visit      Enter Week/Visit # W1 V2  W1 V1   Weight (lbs) 115     Reps (#) 20     Time 145     ROM (degrees) 0-60     Pain No pain, fatigue at end. Consider going up in wt.     Therapist cues         Date 8/6/2024 7/30/24   Exercise      Rotary Torso 30 lbs L 30 lbs R   Seated side bends  5 x5 sec each side 5 x 5 sec each side   Standing extension   3 x 5 sec    Treadmill x 5 min, 4 mph      Posterior pelvic tilt  1 x 10 reps, 2-3 sec holds      Supine abdominal exercise #3b  2 x 10 each side      child's pose  1 x 30 sec       Reformer supine hip extension  1 x 12 2R                                    Modifications instructed by therapist:

## 2024-08-09 ENCOUNTER — THERAPY VISIT (OUTPATIENT)
Dept: PHYSICAL THERAPY | Facility: CLINIC | Age: 54
End: 2024-08-09
Payer: COMMERCIAL

## 2024-08-09 DIAGNOSIS — M47.816 SPONDYLOSIS OF LUMBAR REGION WITHOUT MYELOPATHY OR RADICULOPATHY: Primary | ICD-10-CM

## 2024-08-09 PROCEDURE — 97110 THERAPEUTIC EXERCISES: CPT | Mod: GP

## 2024-08-09 NOTE — PROGRESS NOTES
All weight progressions in therapy sessions are dictated by the patient tolerance and therapist discretion. Testing on MedX equipment is completed on 4-week intervals to allow for physiological change in muscle structure and neuromuscular re-education.     Lumbar [facility] Initial testing 7/30/24   AROM (full=  0-72  lumbar) 0-72    Max Extension Torque  230   Flex: ext ratio (ideal 1.4:1) 8.44:1         Date 8/9/24 8/6/2024 7/30/24   Lumbar Parameters       Top Dead Center (TDC) 24 24 24   Counterbalance (CB) 329 329 329   Seat Pad 0 0 0   Femur Restraint 3 3 3   Week/Visit       Enter Week/Visit # W2 V1 W1 V2  W1 V1   Weight (lbs) 128 115     Reps (#) 15 20     Time 150 145     ROM (degrees) 0-60 0-60     Pain No pain, fatigue at end No pain, fatigue at end. Consider going up in wt.     Therapist cues          Date 8/9/24 8/6/2024 7/30/24   Exercise       Rotary Torso 32 lbs R 30 lbs L 30 lbs R   Seated side bends   5 x5 sec each side 5 x 5 sec each side   Standing extension  Verbal review  3 x 5 sec    Treadmill X 5 min, 4 mph x 5 min, 4 mph      Posterior pelvic tilt  Verbal review 1 x 10 reps, 2-3 sec holds      Supine abdominal exercise #3b  1 x 10 2 x 10 each side      child's pose   1 x 30 sec       Reformer supine hip extension   1 x 12 2R     supine abdominal exercise #9A 2 x 10      supine abdominal exercise #9B  2 x 10       Pallof Press   1 x10 B                 Modifications instructed by therapist:

## 2024-08-13 ENCOUNTER — THERAPY VISIT (OUTPATIENT)
Dept: PHYSICAL THERAPY | Facility: CLINIC | Age: 54
End: 2024-08-13
Payer: COMMERCIAL

## 2024-08-13 DIAGNOSIS — M47.816 SPONDYLOSIS OF LUMBAR REGION WITHOUT MYELOPATHY OR RADICULOPATHY: Primary | ICD-10-CM

## 2024-08-13 PROCEDURE — 97110 THERAPEUTIC EXERCISES: CPT | Mod: GP

## 2024-08-13 NOTE — PROGRESS NOTES
All weight progressions in therapy sessions are dictated by the patient tolerance and therapist discretion. Testing on MedX equipment is completed on 4-week intervals to allow for physiological change in muscle structure and neuromuscular re-education.     Lumbar [facility] Initial testing 7/30/24   AROM (full=  0-72  lumbar) 0-72    Max Extension Torque  230   Flex: ext ratio (ideal 1.4:1) 8.44:1         Date 8/13/24 8/9/24 8/6/2024 7/30/24   Lumbar Parameters        Top Dead Center (TDC) 24 24 24 24   Counterbalance (CB) 329 329 329 329   Seat Pad 0 0 0 0   Femur Restraint 3 3 3 3   Week/Visit        Enter Week/Visit # 2/2 W2 V1 W1 V2  W1 V1   Weight (lbs) 130 128 115     Reps (#) 15 15 20     Time 85 150 145     ROM (degrees) 0-60 0-60 0-60     Pain No pain, fatigue at end No pain, fatigue at end No pain, fatigue at end. Consider going up in wt.     Therapist cues           Date 8/13/24 8/9/24 8/6/2024 7/30/24   Exercise        Rotary Torso 34 lbs L 32 lbs R 30 lbs L 30 lbs R   Seated side bends    5 x5 sec each side 5 x 5 sec each side   Standing extension   Verbal review  3 x 5 sec    Treadmill 5 min, 3.5 mph X 5 min, 4 mph x 5 min, 4 mph      Posterior pelvic tilt   Verbal review 1 x 10 reps, 2-3 sec holds      Supine abdominal exercise #3b   1 x 10 2 x 10 each side      child's pose    1 x 30 sec       Reformer supine hip extension    1 x 12 2R     supine abdominal exercise #9A  2 x 10      supine abdominal exercise #9B   2 x 10       Pallof Press  1 x 15 w/GTB B  1 x10 B       Reformer Leg press X 20, all springs       Reformer Leg press S/L X 15, 3R      Reformer, Calf raises X 15, all springs      Bosu Squats 1 x 10, hands near handrail      Bosu Lunges  1 x 10 B         Modifications instructed by therapist:

## 2024-08-16 ENCOUNTER — THERAPY VISIT (OUTPATIENT)
Dept: PHYSICAL THERAPY | Facility: CLINIC | Age: 54
End: 2024-08-16
Payer: COMMERCIAL

## 2024-08-16 DIAGNOSIS — M47.816 SPONDYLOSIS OF LUMBAR REGION WITHOUT MYELOPATHY OR RADICULOPATHY: Primary | ICD-10-CM

## 2024-08-16 PROCEDURE — 97110 THERAPEUTIC EXERCISES: CPT | Mod: GP

## 2024-08-16 NOTE — PROGRESS NOTES
All weight progressions in therapy sessions are dictated by the patient tolerance and therapist discretion. Testing on MedX equipment is completed on 4-week intervals to allow for physiological change in muscle structure and neuromuscular re-education.     Lumbar [facility] Initial testing 7/30/24   AROM (full=  0-72  lumbar) 0-72    Max Extension Torque  230   Flex: ext ratio (ideal 1.4:1) 8.44:1         Date 8/16/24 8/13/24 8/9/24 8/6/2024 7/30/24   Lumbar Parameters         Top Dead Center (TDC) 24 24 24 24 24   Counterbalance (CB) 329 329 329 329 329   Seat Pad 0 0 0 0 0   Femur Restraint 3 3 3 3 3   Week/Visit         Enter Week/Visit # 3/1 2/2 W2 V1 W1 V2  W1 V1   Weight (lbs) 133 130 128 115     Reps (#) 10 15 15 20     Time 74 85 150 145     ROM (degrees) 0-60 0-60 0-60 0-60     Pain Pt fatigued No pain, fatigue at end No pain, fatigue at end No pain, fatigue at end. Consider going up in wt.     Therapist cues            Date 8/16/24 8/13/24 8/9/24 8/6/2024 7/30/24   Exercise         Rotary Torso 34 lbs R  34 lbs L 32 lbs R 30 lbs L 30 lbs R   Seated side bends     5 x5 sec each side 5 x 5 sec each side   Standing extension    Verbal review  3 x 5 sec    Treadmill 5 min, 3.5 mph 5 min, 3.5 mph X 5 min, 4 mph x 5 min, 4 mph      Posterior pelvic tilt    Verbal review 1 x 10 reps, 2-3 sec holds      Supine abdominal exercise #3b    1 x 10 2 x 10 each side      child's pose     1 x 30 sec       Reformer supine hip extension     1 x 12 2R     supine abdominal exercise #9A 1 x 10   2 x 10      supine abdominal exercise #9B  1 x 10   2 x 10       Pallof Press   1 x 15 w/GTB B  1 x10 B       Reformer Leg press  X 20, [place]       Reformer Leg press S/L  X 15, 3R      Reformer, Calf raises  X 15, [place]      Bosu Squats  1 x 10, hands near handrail      Bosu Lunges   1 x 10 B      Plank 3 x 30 sec       Side plank  2 x 30 sec          Modifications instructed by therapist:

## 2024-08-20 ENCOUNTER — THERAPY VISIT (OUTPATIENT)
Dept: PHYSICAL THERAPY | Facility: CLINIC | Age: 54
End: 2024-08-20
Payer: COMMERCIAL

## 2024-08-20 DIAGNOSIS — M47.816 SPONDYLOSIS OF LUMBAR REGION WITHOUT MYELOPATHY OR RADICULOPATHY: Primary | ICD-10-CM

## 2024-08-20 PROCEDURE — 97110 THERAPEUTIC EXERCISES: CPT | Mod: GP

## 2024-08-20 NOTE — PROGRESS NOTES
All weight progressions in therapy sessions are dictated by the patient tolerance and therapist discretion. Testing on MedX equipment is completed on 4-week intervals to allow for physiological change in muscle structure and neuromuscular re-education.     Lumbar MedX Re-test   8/23/24 Initial testing 7/30/24   AROM (full=  0-72  lumbar) 0-72 0-72    Max Extension Torque   230   Flex: ext ratio (ideal 1.4:1)  8.44:1         Date 8/20/24 8/16/24 8/13/24 8/9/24 8/6/2024 7/30/24   Lumbar Parameters          Top Dead Center (TDC) 24 24 24 24 24 24   Counterbalance (CB) 329 329 329 329 329 329   Seat Pad 0 0 0 0 0 0   Femur Restraint 3 3 3 3 3 3   Week/Visit          Enter Week/Visit # 3/2 3/1 2/2 W2 V1 W1 V2  W1 V1   Weight (lbs) 140 133 130 128 115     Reps (#) 12 10 15 15 20     Time 95 74 85 150 145     ROM (degrees) 0-60 0-60 0-60 0-60 0-60     Pain Pt fatigued Pt fatigued No pain, fatigue at end No pain, fatigue at end No pain, fatigue at end. Consider going up in wt.     Therapist cues             Date 8/20/24 8/16/24 8/13/24 8/9/24 8/6/2024 7/30/24   Exercise          Rotary Torso 38 lbs L 34 lbs R  34 lbs L 32 lbs R 30 lbs L 30 lbs R   Seated side bends      5 x5 sec each side 5 x 5 sec each side   Standing extension     Verbal review  3 x 5 sec    Treadmill 5 min, 3.5 mph 5 min, 3.5 mph 5 min, 3.5 mph X 5 min, 4 mph x 5 min, 4 mph      Posterior pelvic tilt     Verbal review 1 x 10 reps, 2-3 sec holds      Supine abdominal exercise #3b     1 x 10 2 x 10 each side      child's pose      1 x 30 sec       Reformer supine hip extension      1 x 12 2R     supine abdominal exercise #9A  1 x 10   2 x 10      supine abdominal exercise #9B   1 x 10   2 x 10       Pallof Press    1 x 15 w/GTB B  1 x10 B       Reformer Leg press   X 20, [place]       Reformer Leg press S/L   X 15, 3R      Reformer, Calf raises   X 15, [place]      Bosu Squats   1 x 10, hands near handrail      Bosu Lunges    1 x 10 B      Plank 2  x 30 sec 3 x 30 sec       Side plank   2 x 30 sec       Hip abd rollouts 1 x 15 Black TB        Clamshells Sidelying 1 x 15 Black TB           Modifications instructed by therapist:

## 2024-08-23 ENCOUNTER — THERAPY VISIT (OUTPATIENT)
Dept: PHYSICAL THERAPY | Facility: CLINIC | Age: 54
End: 2024-08-23
Payer: COMMERCIAL

## 2024-08-23 DIAGNOSIS — M47.816 SPONDYLOSIS OF LUMBAR REGION WITHOUT MYELOPATHY OR RADICULOPATHY: Primary | ICD-10-CM

## 2024-08-23 PROCEDURE — 97110 THERAPEUTIC EXERCISES: CPT | Mod: GP

## 2024-08-23 NOTE — PROGRESS NOTES
DISCHARGE  Reason for Discharge: Patient has met all goals.    Equipment Issued:     Discharge Plan: Patient to continue home program.    Referring Provider:  Maira Samano       08/23/24 0500   Appointment Info   Signing clinician's name / credentials Lalit Villareal, PT, DPT   Total/Authorized Visits 12   Visits Used 7   Medical Diagnosis M47.816 (ICD-10-CM) - Spondylosis of lumbar region without myelopathy or radiculopathy   PT Tx Diagnosis decreased activity tolerance, poor movement pattern   Precautions/Limitations history of osteopenia   Progress Note/Certification   Onset of illness/injury or Date of Surgery 07/19/24   Therapy Frequency 2x/week for 4 weeks, tapering off to 1x/week over 12 weeks   Predicted Duration 12-16   GOALS   PT Goals 2;3   PT Goal 1   Goal Identifier HEP   Goal Description Patient will be independent with Self-management of symptoms to increase functional activity at home and within the community   Goal Progress met   Target Date 10/22/24   Date Met 08/23/24   PT Goal 2   Goal Identifier Bending/lifting   Goal Description Pt will be able to bend and  object from floor with proper body mechanics for ADLs.   Goal Progress met   Target Date 10/22/24   Date Met 08/23/24   PT Goal 3   Goal Identifier Standing   Goal Description Pt will improve max lumbar extensor strength as measured on the MedX machine by 25% in order to improve tolerance of upright posture while standing and walking around work   Target Date 10/22/24   Date Met 08/23/24   Subjective Report   Subjective Report Pt reported doing a bike ride for about 15 miles. Felt good afterwards no inc in pain or discomfort.   Treatment Interventions (PT)   Interventions Therapeutic Procedure/Exercise;Neuromuscular Re-education;Manual Therapy   Therapeutic Procedure/Exercise   Therapeutic Procedures: strength, endurance, ROM, flexibility minutes (08933) 23   Ther Proc 1 see chart for details on specific exercises   Ther Proc 1 -  Details Reinforced eval findings and POC; improtance of performing HEP consistently to help decrease risk of flare-ups; educated pt on MedX and what its purpose is   Skilled Intervention MedX initial test, initiated HEP, tactile, verbal, and demonstration required for proper form and technique   Patient Response/Progress pt tolerated well   Education   Learner/Method Patient   Education Comments Education provided regarding response to exercise, differentiating between pain and soreness, as well as the importance of compliance with HEP.   Plan   Home program PTRx (phone + printed)   Updates to plan of care Deleted from PTRX: supine ab marches, pelvic tilt, trunk side bend   Plan for next session Plan to dc at this time   Comments   Comments Pt presents for follow-up visit regarding chronic low back pain L>R. In today's session patient was re-tested on Lumbar MedX machine; patient showed good improvement overall and showed a more ideal flex:extension ratio compared to eval day. Patient was able to meet all goals today and was comfortable with HEP to do independently at home. Pt appropriate to discharge at this time.

## 2024-08-23 NOTE — PROGRESS NOTES
All weight progressions in therapy sessions are dictated by the patient tolerance and therapist discretion. Testing on MedX equipment is completed on 4-week intervals to allow for physiological change in muscle structure and neuromuscular re-education.     Lumbar MedX Re-test   8/23/24 Initial testing 7/30/24   AROM (full=  0-72  lumbar) 0-72 0-72    Max Extension Torque  235 230   Flex: ext ratio (ideal 1.4:1) 1.84:1 8.44:1         Date 8/23/24 8/20/24 8/16/24 8/13/24 8/9/24 8/6/2024 7/30/24   Lumbar Parameters           Top Dead Center (TDC)  24 24 24 24 24 24   Counterbalance (CB)  329 329 329 329 329 329   Seat Pad  0 0 0 0 0 0   Femur Restraint  3 3 3 3 3 3   Week/Visit           Enter Week/Visit #  3/2 3/1 2/2 W2 V1 W1 V2  W1 V1   Weight (lbs)  140 133 130 128 115     Reps (#)  12 10 15 15 20     Time  95 74 85 150 145     ROM (degrees)  0-60 0-60 0-60 0-60 0-60     Pain  Pt fatigued Pt fatigued No pain, fatigue at end No pain, fatigue at end No pain, fatigue at end. Consider going up in wt.     Therapist cues              Date 8/23/24 8/20/24 8/16/24 8/13/24 8/9/24 8/6/2024 7/30/24   Exercise           Rotary Torso 40# R 38 lbs L 34 lbs R  34 lbs L 32 lbs R 30 lbs L 30 lbs R   Seated side bends       5 x5 sec each side 5 x 5 sec each side   Standing extension      Verbal review  3 x 5 sec    Treadmill  5 min, 3.5 mph 5 min, 3.5 mph 5 min, 3.5 mph X 5 min, 4 mph x 5 min, 4 mph      Posterior pelvic tilt      Verbal review 1 x 10 reps, 2-3 sec holds      Supine abdominal exercise #3b      1 x 10 2 x 10 each side      child's pose       1 x 30 sec       Reformer supine hip extension       1 x 12 2R     supine abdominal exercise #9A   1 x 10   2 x 10      supine abdominal exercise #9B    1 x 10   2 x 10       Pallof Press     1 x 15 w/GTB B  1 x10 B       Reformer Leg press    X 20, [place] springs       Reformer Leg press S/L    X 15, 3R      Reformer, Calf raises    X 15, [place] springs      Bosu Squats    1 x 10,  hands near handrail      Bosu Lunges     1 x 10 B      Plank  2 x 30 sec 3 x 30 sec       Side plank    2 x 30 sec       Hip abd rollouts  1 x 15 Black TB        Clamshells Sidelying  1 x 15 Black TB           Modifications instructed by therapist:

## 2024-10-22 ENCOUNTER — HOSPITAL ENCOUNTER (EMERGENCY)
Facility: HOSPITAL | Age: 54
Discharge: HOME | End: 2024-10-22
Attending: EMERGENCY MEDICINE | Admitting: EMERGENCY MEDICINE
Payer: COMMERCIAL

## 2024-10-22 ENCOUNTER — APPOINTMENT (OUTPATIENT)
Dept: RADIOLOGY | Facility: HOSPITAL | Age: 54
End: 2024-10-22
Attending: EMERGENCY MEDICINE
Payer: COMMERCIAL

## 2024-10-22 VITALS
HEART RATE: 73 BPM | SYSTOLIC BLOOD PRESSURE: 135 MMHG | TEMPERATURE: 97 F | DIASTOLIC BLOOD PRESSURE: 86 MMHG | OXYGEN SATURATION: 99 % | RESPIRATION RATE: 20 BRPM

## 2024-10-22 DIAGNOSIS — S63.502A WRIST SPRAIN, LEFT, INITIAL ENCOUNTER: ICD-10-CM
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
## 2024-10-22 PROCEDURE — 250N000013 HC RX MED GY IP 250 OP 250 PS 637: Performed by: EMERGENCY MEDICINE

## 2024-10-22 PROCEDURE — 99284 EMERGENCY DEPT VISIT MOD MDM: CPT | Mod: 25

## 2024-10-22 PROCEDURE — 29125 APPL SHORT ARM SPLINT STATIC: CPT | Mod: LT

## 2024-10-22 PROCEDURE — 73110 X-RAY EXAM OF WRIST: CPT | Mod: LT

## 2024-10-22 PROCEDURE — 73130 X-RAY EXAM OF HAND: CPT | Mod: LT

## 2024-10-22 RX ORDER — OXYCODONE AND ACETAMINOPHEN 5; 325 MG/1; MG/1
1 TABLET ORAL ONCE
Status: COMPLETED | OUTPATIENT
Start: 2024-10-22 | End: 2024-10-22

## 2024-10-22 RX ADMIN — OXYCODONE HYDROCHLORIDE AND ACETAMINOPHEN 1 TABLET: 5; 325 TABLET ORAL at 22:10

## 2024-10-22 ASSESSMENT — COLUMBIA-SUICIDE SEVERITY RATING SCALE - C-SSRS
1. IN THE PAST MONTH, HAVE YOU WISHED YOU WERE DEAD OR WISHED YOU COULD GO TO SLEEP AND NOT WAKE UP?: NO
2. HAVE YOU ACTUALLY HAD ANY THOUGHTS OF KILLING YOURSELF IN THE PAST MONTH?: NO
6. HAVE YOU EVER DONE ANYTHING, STARTED TO DO ANYTHING, OR PREPARED TO DO ANYTHING TO END YOUR LIFE?: NO

## 2024-10-22 ASSESSMENT — ACTIVITIES OF DAILY LIVING (ADL): ADLS_ACUITY_SCORE: 35

## 2024-10-23 NOTE — ED NOTES
Patient discharged at 2235 to home; family providing transportation. Patient provided with all discharge paperwork and educational material. All questions answered to patient's satisfaction.
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
## 2025-06-15 ENCOUNTER — HEALTH MAINTENANCE LETTER (OUTPATIENT)
Age: 55
End: 2025-06-15

## (undated) RX ORDER — FENTANYL CITRATE 50 UG/ML
INJECTION, SOLUTION INTRAMUSCULAR; INTRAVENOUS
Status: DISPENSED
Start: 2024-06-12

## (undated) RX ORDER — SIMETHICONE 40MG/0.6ML
SUSPENSION, DROPS(FINAL DOSAGE FORM)(ML) ORAL
Status: DISPENSED
Start: 2024-06-12